# Patient Record
Sex: FEMALE | Race: OTHER | HISPANIC OR LATINO | Employment: FULL TIME | URBAN - METROPOLITAN AREA
[De-identification: names, ages, dates, MRNs, and addresses within clinical notes are randomized per-mention and may not be internally consistent; named-entity substitution may affect disease eponyms.]

---

## 2020-11-14 ENCOUNTER — HOSPITAL ENCOUNTER (OUTPATIENT)
Facility: MEDICAL CENTER | Age: 45
End: 2020-11-14
Attending: PHYSICIAN ASSISTANT
Payer: COMMERCIAL

## 2020-11-14 ENCOUNTER — OFFICE VISIT (OUTPATIENT)
Dept: URGENT CARE | Facility: PHYSICIAN GROUP | Age: 45
End: 2020-11-14
Payer: COMMERCIAL

## 2020-11-14 VITALS
DIASTOLIC BLOOD PRESSURE: 92 MMHG | RESPIRATION RATE: 20 BRPM | HEART RATE: 84 BPM | BODY MASS INDEX: 36.15 KG/M2 | SYSTOLIC BLOOD PRESSURE: 130 MMHG | TEMPERATURE: 97.8 F | HEIGHT: 65 IN | OXYGEN SATURATION: 96 % | WEIGHT: 217 LBS

## 2020-11-14 DIAGNOSIS — J01.90 ACUTE BACTERIAL SINUSITIS: ICD-10-CM

## 2020-11-14 DIAGNOSIS — B96.89 ACUTE BACTERIAL SINUSITIS: ICD-10-CM

## 2020-11-14 PROCEDURE — 99214 OFFICE O/P EST MOD 30 MIN: CPT | Performed by: PHYSICIAN ASSISTANT

## 2020-11-14 PROCEDURE — U0003 INFECTIOUS AGENT DETECTION BY NUCLEIC ACID (DNA OR RNA); SEVERE ACUTE RESPIRATORY SYNDROME CORONAVIRUS 2 (SARS-COV-2) (CORONAVIRUS DISEASE [COVID-19]), AMPLIFIED PROBE TECHNIQUE, MAKING USE OF HIGH THROUGHPUT TECHNOLOGIES AS DESCRIBED BY CMS-2020-01-R: HCPCS

## 2020-11-14 RX ORDER — AMOXICILLIN 500 MG/1
500 CAPSULE ORAL 2 TIMES DAILY
Qty: 14 CAP | Refills: 0 | Status: SHIPPED | OUTPATIENT
Start: 2020-11-14 | End: 2020-11-21

## 2020-11-14 SDOH — HEALTH STABILITY: MENTAL HEALTH: HOW MANY STANDARD DRINKS CONTAINING ALCOHOL DO YOU HAVE ON A TYPICAL DAY?: 5 OR 6

## 2020-11-14 SDOH — HEALTH STABILITY: MENTAL HEALTH: HOW OFTEN DO YOU HAVE A DRINK CONTAINING ALCOHOL?: 4 OR MORE TIMES A WEEK

## 2020-11-14 SDOH — HEALTH STABILITY: MENTAL HEALTH: HOW OFTEN DO YOU HAVE 6 OR MORE DRINKS ON ONE OCCASION?: WEEKLY

## 2020-11-14 ASSESSMENT — ENCOUNTER SYMPTOMS
DIARRHEA: 0
FEVER: 0
MYALGIAS: 0
CONSTIPATION: 0
SORE THROAT: 0
SHORTNESS OF BREATH: 0
VOMITING: 0
EYE PAIN: 0
COUGH: 0
NAUSEA: 0
HEADACHES: 1
CHILLS: 0
SINUS PAIN: 1
ABDOMINAL PAIN: 0

## 2020-11-14 NOTE — PROGRESS NOTES
Subjective:   Ann Munoz is a 44 y.o. female who presents for Sinusitis (Pt reports sinus pain and congestion. Onset one week. )      This is a pleasant 44-year-old female who is had 1 week of sinus congestion which progressed into acutely worsening frontal sinus pressure and pain 3 days ago.  For the history of similar problems.  She does use an intranasal steroid spray but has not tried an antihistamine yet.  She has not noticed any fevers or chills, body aches.  She has a frontal sinus/frontal headache which seems to wax and wane throughout the day.  She has a lot of dogs in her house which she attributes her allergies to.  She has no known sick contacts, no recent travel, and no recent antibiotics      Review of Systems   Constitutional: Negative for chills and fever.   HENT: Positive for congestion and sinus pain. Negative for ear pain and sore throat.    Eyes: Negative for pain.   Respiratory: Negative for cough and shortness of breath.    Cardiovascular: Negative for chest pain.   Gastrointestinal: Negative for abdominal pain, constipation, diarrhea, nausea and vomiting.   Genitourinary: Negative for dysuria.   Musculoskeletal: Negative for myalgias.   Skin: Negative for rash.   Neurological: Positive for headaches.       Medications:    • amoxicillin Caps  • meclizine Tabs  • ondansetron Tabs    Allergies: Patient has no known allergies.    Problem List: Ann Munoz does not have a problem list on file.    Surgical History:  No past surgical history on file.    Past Social Hx: Ann Munoz  reports that she has never smoked. She has never used smokeless tobacco. She reports current alcohol use. She reports current drug use. Frequency: 7.00 times per week. Drug: Marijuana.     Past Family Hx:  Ann Munoz family history is not on file.     Problem list, medications, and allergies reviewed by myself today in Epic.     Objective:     /92 (BP Location: Left arm, Patient Position:  "Sitting, BP Cuff Size: Large adult)   Pulse 84   Temp 36.6 °C (97.8 °F) (Temporal)   Resp 20   Ht 1.651 m (5' 5\")   Wt 98.4 kg (217 lb)   SpO2 96%   BMI 36.11 kg/m²     Physical Exam  Vitals signs reviewed.   Constitutional:       Appearance: Normal appearance.   HENT:      Head: Normocephalic and atraumatic.      Right Ear: External ear normal.      Left Ear: External ear normal.      Nose: Congestion and rhinorrhea present.      Mouth/Throat:      Mouth: Mucous membranes are moist.   Eyes:      Conjunctiva/sclera: Conjunctivae normal.   Cardiovascular:      Rate and Rhythm: Normal rate.   Pulmonary:      Effort: Pulmonary effort is normal.   Skin:     General: Skin is warm and dry.      Capillary Refill: Capillary refill takes less than 2 seconds.   Neurological:      Mental Status: She is alert and oriented to person, place, and time.         Assessment/Plan:     Diagnosis and associated orders:     1. Acute bacterial sinusitis  amoxicillin (AMOXIL) 500 MG Cap    COVID/SARS COV-2 PCR      Comments/MDM:     • Will test for covid out of caution.  • Double worsening c/w ABRS  • Flonase, add antihistamine  • Return if no improvement in 48 hours, ER if worsening or neuro sxs           Differential diagnosis, natural history, supportive care, and indications for immediate follow-up discussed.    Advised the patient to follow-up with the primary care physician for recheck, reevaluation, and consideration of further management.    Please note that this dictation was created using voice recognition software. I have made a reasonable attempt to correct obvious errors, but I expect that there are errors of grammar and possibly content that I did not discover before finalizing the note.    This note was electronically signed by Fabrice Robin PA-C  "

## 2020-11-14 NOTE — PATIENT INSTRUCTIONS
COVID-19 Test Results & Instructions (11-9-20)     After Your COVID-19 Test: Stay Home!  Please stay home until you have received your test results, even if you do not have any symptoms.     What do I do if my test is …           Positive    If your test result was positive (detected), this means the novel coronavirus (SARS-CoV-2) that causes COVID-19 was present in your test sample.   If your symptoms are generally mild and stable, please isolate yourself at home. If it becomes difficult to breathe, contact your primary care provider (by phone) as soon as possible.     Next steps:   • Stay home except to get medical care.   • Follow the instructions for home isolation below.   • Call ahead before visiting your primary care provider or a hospital. Ask for an online virtual visit if possible.     When can my home isolation end?   You should isolate yourself:   • For a minimum of 10 days and   • At least 24 hours have passed since your last fever without the use of fever-reducing medications and   • All other symptoms have improved.                      Negative           If you tested negative (not detected), it is highly unlikely that you have COVID-19. Several respiratory viruses can cause symptoms like yours, including the common cold or the flu.     Next steps:   • Stay home while you are feeling sick.   • Monitor your symptoms and contact your primary care provider if they get worse.   • If you have questions, contact your primary care provider.     When can my home isolation end?   If you were tested because you had close contact (defined below) with someone with COVID-19, you should stay home for 14 days after your close contact with the person.   What counts as close contact?   • You were within 6 feet of someone who has COVID-19 for a total of 15 minutes or more;   • You provided care at home to someone who is sick with COVID-19;   • You had direct physical contact with the person (hugged or kissed them);    • You shared eating or drinking utensils;   • They sneezed, coughed, or somehow got respiratory droplets on you.     If you were tested because you were exposed to a household contact with COVID-19, you should still quarantine yourself for a period of 14 days. The 14-day quarantine period begins once your affected household contact is isolated. If you are unable to quarantine yourself from your affected household contact, the 14-day quarantine period begins when the patient meets criteria to break isolation.     If you were not exposed to a household contact with COVID-19, you may still be contagious while experiencing symptoms, so you should not return to work or regular activities until 24 hours after symptoms fully improve. For example, if you feel back to normal on Tuesday, you should remain isolated until Wednesday.          Instructions for Self-Isolation at Home:   We recommend you stay in your home and minimize contact with others to avoid spreading this infection.   • Stay home except to get medical care. Do not go to work, school or public areas. Avoid using public transportation, ridesharing or taxis.   • Separate yourself from other people in your home as much as possible. Stay in a specific room and away from other people in your home as much as possible. Use a separate bathroom if possible.   • Clean your hands often. Wash your hands often with soap and water for at least 20 seconds. If soap and water are not available, clean your hands with an alcohol-based hand  that contains at least 60% alcohol, covering all surfaces of your hands and rubbing them together until they feel dry. Avoid touching your eyes, nose and mouth with unwashed hands.   • Do not share household items with other people in your home. This includes sharing dishes, drinking glasses, cups, eating utensils, towels or bedding. After using these items, they should be washed thoroughly with soap and water.   • Clean all  "\"high-touch\" surfaces regularly. This includes counters, tabletops, doorknobs, bathroom fixtures, toilets, phones, keyboards, tablets and bedside tables. Also, clean any surfaces that may have blood, stool or body fluids on them. Use a household cleaning spray or wipe, according to the label instructions.   • Cover your coughs and sneezes with a tissue, mask or the inside of your elbow. Throw used tissues in a lined trash can; immediately wash your hands with soap and water for at least 20 seconds or clean your hands with an alcohol-based hand  that contains at least 60% alcohol. Soap and water should be used if hands are visibly dirty.     When seeking care at a healthcare facility:   • Seek prompt medical attention if your illness is worsening (e.g., difficulty breathing).   • When possible, call the healthcare provider before arriving.   • Put on a face mask before you enter the facility.   • If possible, put on a face mask before the ambulance or paramedics arrive.   • These steps will help the healthcare provider's office prevent other people from getting infected or exposed.     Detailed information about these steps can be found on the Centers for Disease Control and Prevention's website.     "

## 2020-11-14 NOTE — LETTER
November 14, 2020    To Whom It May Concern:         This is confirmation that Ann Munoz attended her scheduled appointment with Fabrice Robin P.A.-C. on 11/14/20.   Your employee was seen in our clinic today.  A concern for COVID-19 has been identified and testing is in progress. We are asking you to excuse absences while following self-isolation protocol per Center for Disease Control (CDC) guidelines.  Your employee will be able to access test results through our electronic delivery system called [employer].     If the results of testing are positive, your employee should follow the CDC guidelines.  These are isolation for a minimum of 10 days and at least 24 hours have passed since your last fever without the use of fever-reducing medications and all other symptoms have improved.  The health department may contact you and provide further directions regarding self-isolation and return to work.     Negative result without close contact*:  If your employee was tested due to their symptoms without close contact to someone with COVID-19 and their test is negative: your employee should not return to work or regular activities until 24 hours after symptoms fully improve. (For example, if patient feels back to normal on Tuesday, should remain isolated through Wednesday).      Negative with close contact*:  If your employee was tested due to close contact to someone, they should self isolate for 14 days after their exposure.    Negative with positive household member  If your employee was due to a positive household member they should still quarantine for a period of 14 days.  The 14 day period begins once the household member is isolated. If unable to quarantine (for example mom from infant), the CDC advises an additional 14-day quarantine period from the COVID-19 household member.   In general, repeat testing is not necessary and not offered through our [facility] urgent care.     This is the only note that  will be provided from [employer] for this visit.  Your employee will require an appointment with a primary care provider if FMLA or disability forms are required.  If you have any questions please do not hesitate to call me at the phone number listed below.    Sincerely,    ELMER Poole.-C.  [phone]

## 2020-11-16 DIAGNOSIS — J01.90 ACUTE BACTERIAL SINUSITIS: ICD-10-CM

## 2020-11-16 DIAGNOSIS — B96.89 ACUTE BACTERIAL SINUSITIS: ICD-10-CM

## 2020-11-16 LAB
COVID ORDER STATUS COVID19: NORMAL
SARS-COV-2 RNA RESP QL NAA+PROBE: NOTDETECTED
SPECIMEN SOURCE: NORMAL

## 2020-12-29 ENCOUNTER — HOSPITAL ENCOUNTER (OUTPATIENT)
Facility: MEDICAL CENTER | Age: 45
End: 2020-12-29
Attending: STUDENT IN AN ORGANIZED HEALTH CARE EDUCATION/TRAINING PROGRAM
Payer: COMMERCIAL

## 2020-12-29 ENCOUNTER — OFFICE VISIT (OUTPATIENT)
Dept: URGENT CARE | Facility: PHYSICIAN GROUP | Age: 45
End: 2020-12-29
Payer: COMMERCIAL

## 2020-12-29 VITALS
WEIGHT: 200 LBS | TEMPERATURE: 99.1 F | DIASTOLIC BLOOD PRESSURE: 86 MMHG | RESPIRATION RATE: 16 BRPM | SYSTOLIC BLOOD PRESSURE: 138 MMHG | OXYGEN SATURATION: 97 % | HEART RATE: 77 BPM | HEIGHT: 64 IN | BODY MASS INDEX: 34.15 KG/M2

## 2020-12-29 DIAGNOSIS — Z20.822 COVID-19 RULED OUT: ICD-10-CM

## 2020-12-29 DIAGNOSIS — Z71.89 COUNSELED ABOUT COVID-19 VIRUS INFECTION: ICD-10-CM

## 2020-12-29 DIAGNOSIS — R11.2 NON-INTRACTABLE VOMITING WITH NAUSEA, UNSPECIFIED VOMITING TYPE: ICD-10-CM

## 2020-12-29 DIAGNOSIS — B34.9 ACUTE VIRAL SYNDROME: ICD-10-CM

## 2020-12-29 LAB — COVID ORDER STATUS COVID19: NORMAL

## 2020-12-29 PROCEDURE — 99214 OFFICE O/P EST MOD 30 MIN: CPT | Mod: CS | Performed by: STUDENT IN AN ORGANIZED HEALTH CARE EDUCATION/TRAINING PROGRAM

## 2020-12-29 PROCEDURE — U0003 INFECTIOUS AGENT DETECTION BY NUCLEIC ACID (DNA OR RNA); SEVERE ACUTE RESPIRATORY SYNDROME CORONAVIRUS 2 (SARS-COV-2) (CORONAVIRUS DISEASE [COVID-19]), AMPLIFIED PROBE TECHNIQUE, MAKING USE OF HIGH THROUGHPUT TECHNOLOGIES AS DESCRIBED BY CMS-2020-01-R: HCPCS

## 2020-12-29 RX ORDER — ONDANSETRON 4 MG/1
4 TABLET, ORALLY DISINTEGRATING ORAL EVERY 6 HOURS PRN
Qty: 20 TAB | Refills: 0 | Status: SHIPPED | OUTPATIENT
Start: 2020-12-29 | End: 2022-01-21

## 2020-12-29 RX ORDER — ALBUTEROL SULFATE 90 UG/1
2 AEROSOL, METERED RESPIRATORY (INHALATION) EVERY 6 HOURS PRN
Qty: 8.5 G | Refills: 0 | Status: SHIPPED | OUTPATIENT
Start: 2020-12-29 | End: 2022-01-21

## 2020-12-29 NOTE — PROGRESS NOTES
Subjective:   CHIEF COMPLAINT  Chief Complaint   Patient presents with   • Emesis     nausea, body aches, headaches, feverish x 2 days       HPI  Ann Munoz is a 45 y.o. female who presents with a chief complaint of body aches, nausea and vomiting which started approximately 2 days ago after attending a work party.  Following the work party multiple people have called off of work due to being sick.  Uncertain if anyone tested positive for Covid.  Reports she vomited once today.  No diarrhea.  Admits experiencing generalized abdominal discomfort.  Says she feels somewhat constipated; last BM was yesterday. Symptoms improved with Motrin.  No aggravating factors.  She admits associated symptoms of wheezing.  She denies cough, shortness of breath, dysuria or hematuria.  No history of reactive airway disease or tobacco use.    REVIEW OF SYSTEMS  General: no fever or chills  GI: Admits nausea vomiting  See HPI for further details.     PAST MEDICAL HISTORY       SURGICAL HISTORY  patient denies any surgical history    ALLERGIES  No Known Allergies    CURRENT MEDICATIONS  Home Medications     Reviewed by Mauricio Dc D.O. (Physician) on 12/29/20 at 1157  Med List Status: <None>   Medication Last Dose Status   meclizine (ANTIVERT) 25 MG Tab Not Taking Active   ondansetron (ZOFRAN) 4 MG Tab tablet Not Taking Active                SOCIAL HISTORY  Social History     Tobacco Use   • Smoking status: Never Smoker   • Smokeless tobacco: Never Used   Substance and Sexual Activity   • Alcohol use: Yes     Frequency: 4 or more times a week     Drinks per session: 5 or 6     Binge frequency: Weekly   • Drug use: Yes     Frequency: 7.0 times per week     Types: Marijuana   • Sexual activity: Not on file       FAMILY HISTORY  No family history on file.       Objective:   PHYSICAL EXAM  VITAL SIGNS: /86 (BP Location: Left arm, Patient Position: Sitting, BP Cuff Size: Adult)   Pulse 77   Temp 37.3 °C (99.1 °F)  "(Temporal)   Resp 16   Ht 1.626 m (5' 4\")   Wt 90.7 kg (200 lb)   LMP 12/07/2020   SpO2 97%   BMI 34.33 kg/m²     Gen: no acute distress, normal voice  Skin: dry, intact, moist mucosal membranes  Abdomen: Soft, no guarding, mild RLQ TTP w/o rebound or guarding  Lungs: scattered wheezing w/ symmetric expansion  CV: RRR w/o murmurs or clicks  Psych: normal affect, normal judgement, alert, awake    Assessment/Plan:     1. Acute viral syndrome  COVID/SARS COV-2 PCR    albuterol 108 (90 Base) MCG/ACT Aero Soln inhalation aerosol   2. Non-intractable vomiting with nausea, unspecified vomiting type  ondansetron (ZOFRAN ODT) 4 MG TABLET DISPERSIBLE   3. COVID-19 ruled out  COVID/SARS COV-2 PCR   4. Counseled about COVID-19 virus infection     Signs and symptoms are consistent with a viral infection.  She did demonstrate very mild RLQ TTP on exam however there is no rebound or guarding.  She is afebrile and able to tolerate p.o. intake.  Exam is reassuring and no red flags.  Will treat symptomatically with return precautions  -Rx sent for Zofran and albuterol  -If at any point the abdominal pain worsens, develops any worsening/concerning symptoms or has any further questions or concerns she was instructed to return to emergency room or urgent care for further evaluation  -Ordered Covid.  Results will be sent through [employer].  -Instructed to quarantine until Covid results have been returned  -Encouraged hydration and symptomatic treatment with Tylenol/ibuprofen prn  -Pt was in full understanding and agreement with the plan.    Differential diagnosis, natural history, supportive care, and indications for immediate follow-up discussed. All questions answered. Patient agrees with the plan of care.    Follow-up as needed if symptoms worsen or fail to improve to PCP, Urgent care or Emergency Room.       Please note that this dictation was created using voice recognition software. I have made a reasonable attempt to correct " obvious errors, but I expect that there are errors of grammar and possibly content that I did not discover before finalizing the note.

## 2020-12-31 LAB
SARS-COV-2 RNA RESP QL NAA+PROBE: NOTDETECTED
SPECIMEN SOURCE: NORMAL

## 2022-01-14 ENCOUNTER — OFFICE VISIT (OUTPATIENT)
Dept: URGENT CARE | Facility: PHYSICIAN GROUP | Age: 47
End: 2022-01-14
Payer: COMMERCIAL

## 2022-01-14 VITALS
RESPIRATION RATE: 20 BRPM | WEIGHT: 220.6 LBS | DIASTOLIC BLOOD PRESSURE: 86 MMHG | SYSTOLIC BLOOD PRESSURE: 126 MMHG | OXYGEN SATURATION: 98 % | HEIGHT: 65 IN | BODY MASS INDEX: 36.75 KG/M2 | TEMPERATURE: 97.2 F | HEART RATE: 79 BPM

## 2022-01-14 DIAGNOSIS — J01.00 ACUTE NON-RECURRENT MAXILLARY SINUSITIS: ICD-10-CM

## 2022-01-14 PROCEDURE — 99213 OFFICE O/P EST LOW 20 MIN: CPT | Performed by: PHYSICIAN ASSISTANT

## 2022-01-14 RX ORDER — AMOXICILLIN AND CLAVULANATE POTASSIUM 875; 125 MG/1; MG/1
1 TABLET, FILM COATED ORAL 2 TIMES DAILY
Qty: 14 TABLET | Refills: 0 | Status: SHIPPED | OUTPATIENT
Start: 2022-01-14 | End: 2022-01-21

## 2022-01-14 RX ORDER — COVID-19 TEST SPECIMEN COLLECT
MISCELLANEOUS MISCELLANEOUS
COMMUNITY
Start: 2021-12-30 | End: 2022-01-21

## 2022-01-14 NOTE — LETTER
January 14, 2022         Patient: Ann Munoz   YOB: 1975   Date of Visit: 1/14/2022           To Whom it May Concern:    Ann Munoz was seen in my clinic on 1/14/2022.     If you have any questions or concerns, please don't hesitate to call.        Sincerely,           Sancho Mcdermott P.A.-C.  Electronically Signed

## 2022-01-15 NOTE — PROGRESS NOTES
"Subjective:   Ann Munoz is a 46 y.o. female who presents for Headache (face pain/ pressure )      HPI  Patient is a 46-year-old female who presents to clinic with concerns of a sinus infection. She states she is prone to sinus infections. She reports she had a headache for about 6 days which was mild followed by moderate to severe maxillary sinus pain a couple days ago. Associated nasal congestion. Mucinex sinus without relief. Flonase without much relief. Denies any fever, chills, cough, chest pain, SOB, abdominal pain, vomiting, diarrhea. Received COVID vaccine. No known exposure to COVID.         Medications:    • albuterol Aers  • meclizine Tabs  • ondansetron Tabs  • ondansetron Tbdp    Allergies: Patient has no known allergies.    Problem List: Ann Munoz does not have a problem list on file.    Surgical History:  No past surgical history on file.    Past Social Hx: Ann Munoz  reports that she has never smoked. She has never used smokeless tobacco. She reports current alcohol use. She reports current drug use. Frequency: 7.00 times per week. Drug: Marijuana.     Past Family Hx:  Ann Munoz family history is not on file.     Problem list, medications, and allergies reviewed by myself today in Epic.     Objective:     /86 (BP Location: Right arm, Patient Position: Sitting)   Pulse 79   Temp 36.2 °C (97.2 °F) (Temporal)   Resp 20   Ht 1.651 m (5' 5\")   Wt 100 kg (220 lb 9.6 oz)   SpO2 98%   BMI 36.71 kg/m²     Physical Exam  Vitals reviewed.   Constitutional:       General: She is not in acute distress.     Appearance: Normal appearance. She is not ill-appearing or toxic-appearing.   HENT:      Head: Normocephalic.      Right Ear: Tympanic membrane normal.      Left Ear: Tympanic membrane normal.      Nose: Mucosal edema and rhinorrhea present. Rhinorrhea is purulent.      Right Sinus: Maxillary sinus tenderness and frontal sinus tenderness present.      Left Sinus: " Maxillary sinus tenderness and frontal sinus tenderness present.      Mouth/Throat:      Mouth: Mucous membranes are moist.      Pharynx: Oropharynx is clear. No oropharyngeal exudate or posterior oropharyngeal erythema.   Eyes:      Conjunctiva/sclera: Conjunctivae normal.      Pupils: Pupils are equal, round, and reactive to light.   Cardiovascular:      Rate and Rhythm: Normal rate and regular rhythm.      Heart sounds: Normal heart sounds.   Pulmonary:      Effort: Pulmonary effort is normal. No respiratory distress.      Breath sounds: Normal breath sounds. No wheezing, rhonchi or rales.   Musculoskeletal:      Cervical back: Neck supple.   Skin:     General: Skin is warm and dry.   Neurological:      General: No focal deficit present.      Mental Status: She is alert and oriented to person, place, and time.   Psychiatric:         Mood and Affect: Mood normal.         Behavior: Behavior normal.         Diagnosis and associated orders:     1. Acute non-recurrent maxillary sinusitis  amoxicillin-clavulanate (AUGMENTIN) 875-125 MG Tab        Comments/MDM:     Discussed patient's signs and symptoms are consistent with sinusitis  Contingent antibiotic prescription given to patient to fill upon meeting criteria of strict guidelines discussed in length.   Encouraged plenty of fluids, Flonase, nasal saline washes or ghanshyam pot, Mucinex, Ibuprofen and Tylenol for pain. They may take a non-drowsy oral antihistamine.  Advised to return to the clinic or present to the ED if any worsening symptoms or fever, chills, vision changes, difficulty breathing, or any other concerns.   Pt declined COVID test.        I personally reviewed prior external notes and test results pertinent to today's visit. Supportive care, natural history, differential diagnoses, and indications for immediate follow-up discussed. Patient expresses understanding and agrees to plan. Patient denies any other questions or concerns.     Follow-up with the  primary care physician for recheck, reevaluation, and consideration of further management.    Please note that this dictation was created using voice recognition software. I have made a reasonable attempt to correct obvious errors, but I expect that there are errors of grammar and possibly content that I did not discover before finalizing the note.    This note was electronically signed by Sancho Mcdermott PA-C

## 2022-01-21 ENCOUNTER — HOSPITAL ENCOUNTER (OUTPATIENT)
Dept: RADIOLOGY | Facility: MEDICAL CENTER | Age: 47
End: 2022-01-21
Attending: STUDENT IN AN ORGANIZED HEALTH CARE EDUCATION/TRAINING PROGRAM
Payer: COMMERCIAL

## 2022-01-21 ENCOUNTER — OFFICE VISIT (OUTPATIENT)
Dept: URGENT CARE | Facility: PHYSICIAN GROUP | Age: 47
End: 2022-01-21
Payer: COMMERCIAL

## 2022-01-21 VITALS
TEMPERATURE: 97.5 F | RESPIRATION RATE: 16 BRPM | HEIGHT: 65 IN | WEIGHT: 215 LBS | DIASTOLIC BLOOD PRESSURE: 78 MMHG | SYSTOLIC BLOOD PRESSURE: 130 MMHG | BODY MASS INDEX: 35.82 KG/M2 | OXYGEN SATURATION: 96 % | HEART RATE: 84 BPM

## 2022-01-21 DIAGNOSIS — R10.31 RIGHT LOWER QUADRANT ABDOMINAL PAIN: ICD-10-CM

## 2022-01-21 LAB
APPEARANCE UR: NORMAL
BILIRUB UR STRIP-MCNC: NEGATIVE MG/DL
COLOR UR AUTO: NORMAL
GLUCOSE UR STRIP.AUTO-MCNC: NEGATIVE MG/DL
INT CON NEG: NORMAL
INT CON POS: NORMAL
KETONES UR STRIP.AUTO-MCNC: NORMAL MG/DL
LEUKOCYTE ESTERASE UR QL STRIP.AUTO: NEGATIVE
NITRITE UR QL STRIP.AUTO: NEGATIVE
PH UR STRIP.AUTO: 6.5 [PH] (ref 5–8)
POC URINE PREGNANCY TEST: NEGATIVE
PROT UR QL STRIP: NEGATIVE MG/DL
RBC UR QL AUTO: NEGATIVE
SP GR UR STRIP.AUTO: 1.03
UROBILINOGEN UR STRIP-MCNC: 1 MG/DL

## 2022-01-21 PROCEDURE — 81025 URINE PREGNANCY TEST: CPT | Performed by: STUDENT IN AN ORGANIZED HEALTH CARE EDUCATION/TRAINING PROGRAM

## 2022-01-21 PROCEDURE — 74177 CT ABD & PELVIS W/CONTRAST: CPT

## 2022-01-21 PROCEDURE — 700117 HCHG RX CONTRAST REV CODE 255: Performed by: STUDENT IN AN ORGANIZED HEALTH CARE EDUCATION/TRAINING PROGRAM

## 2022-01-21 PROCEDURE — 99215 OFFICE O/P EST HI 40 MIN: CPT | Performed by: STUDENT IN AN ORGANIZED HEALTH CARE EDUCATION/TRAINING PROGRAM

## 2022-01-21 PROCEDURE — 81002 URINALYSIS NONAUTO W/O SCOPE: CPT | Performed by: STUDENT IN AN ORGANIZED HEALTH CARE EDUCATION/TRAINING PROGRAM

## 2022-01-21 RX ADMIN — IOHEXOL 100 ML: 350 INJECTION, SOLUTION INTRAVENOUS at 17:00

## 2022-01-21 NOTE — PROGRESS NOTES
Subjective:   CHIEF COMPLAINT  Chief Complaint   Patient presents with   • Abdominal Pain     RLQ. Onset 2 days.        HPI  Ann Munoz is a 46 y.o. female who presents with a chief complaint of right lower quadrant abdominal pain which started on Wednesday night (2 days ago).  Symptoms are described as an achy discomfort which have been constant since onset.  Overall symptoms have improved today but were rather severe yesterday.  Symptoms are triggered with deep breaths but no additional aggravating or modifying factors.  Patient reports she has had a diminished appetite but has been tolerating p.o. intake without any nausea, vomiting, diarrhea or constipation.  Patient has had normal bowel movements.  Reports she had pot stickers and a hot dog yesterday without any issues.  No fevers, chills or body aches.  No dysuria or hematuria.  No history of ovarian cyst and patient has regular menstrual cycles.     REVIEW OF SYSTEMS  General: no fever or chills  GI: no nausea or vomiting  See HPI for further details.    PAST MEDICAL HISTORY  There are no problems to display for this patient.      SURGICAL HISTORY  patient denies any surgical history    ALLERGIES  No Known Allergies    CURRENT MEDICATIONS  Home Medications     Reviewed by Dannie Powell't (Medical Assistant) on 01/21/22 at 0938  Med List Status: <None>   Medication Last Dose Status   albuterol 108 (90 Base) MCG/ACT Aero Soln inhalation aerosol Not Taking Flagged for Removal   amoxicillin-clavulanate (AUGMENTIN) 875-125 MG Tab Not Taking Flagged for Removal   meclizine (ANTIVERT) 25 MG Tab Not Taking Flagged for Removal   ondansetron (ZOFRAN ODT) 4 MG TABLET DISPERSIBLE Not Taking Flagged for Removal   ondansetron (ZOFRAN) 4 MG Tab tablet Not Taking Flagged for Removal                SOCIAL HISTORY  Social History     Tobacco Use   • Smoking status: Never Smoker   • Smokeless tobacco: Never Used   Vaping Use   • Vaping Use: Never used  "  Substance and Sexual Activity   • Alcohol use: Not Currently   • Drug use: Yes     Frequency: 7.0 times per week     Types: Marijuana, Inhaled   • Sexual activity: Not on file       FAMILY HISTORY  History reviewed. No pertinent family history.       Objective:   PHYSICAL EXAM  VITAL SIGNS: /78 (BP Location: Right arm, Patient Position: Sitting, BP Cuff Size: Large adult)   Pulse 84   Temp 36.4 °C (97.5 °F) (Temporal)   Resp 16   Ht 1.651 m (5' 5\")   Wt 97.5 kg (215 lb)   SpO2 96%   BMI 35.78 kg/m²     Gen: no acute distress, normal voice  Skin: dry, intact, moist mucosal membranes  Lungs: CTAB w/ symmetric expansion  CV: RRR w/o murmurs or clicks  Abdomen: Bowel sounds normal, soft, localized TTP along McBurney's point without rebound. Slight involuntary guarding.    Psych: normal affect, normal judgement, alert, awake    UA: Trace ketones. No blood, nitrites or leukocytes.      RADIOLOGY RESULTS   CT-ABDOMEN-PELVIS WITH    Result Date: 1/21/2022 1/21/2022 4:07 PM HISTORY/REASON FOR EXAM:  RLQ abdominal pain (Age >= 14y). TECHNIQUE/EXAM DESCRIPTION:   CT scan of the abdomen and pelvis with contrast. Contrast-enhanced helical scanning was obtained from the diaphragmatic domes through the pubic symphysis following the bolus administration of nonionic contrast without complication. 100 mL of Omnipaque 350 nonionic contrast was administered without complication. Low dose optimization technique was utilized for this CT exam including automated exposure control and adjustment of the mA and/or kV according to patient size. COMPARISON: No prior studies available. FINDINGS: Lower Chest: Unremarkable. Liver: There is diffuse low-attenuation in the liver consistent with hepatic steatosis. The liver is enlarged. Spleen: Unremarkable. Pancreas: Unremarkable. Gallbladder: The gallbladder has been resected. Biliary: Nondilated. Adrenal glands: Normal. Kidneys: Unremarkable without hydronephrosis. Bowel: No " obstruction or acute inflammation. There is diverticulosis without evidence of diverticulitis. A normal-appearing air-filled appendix identified. Lymph nodes: No adenopathy. Vasculature: Unremarkable. Peritoneum: Unremarkable without ascites. Musculoskeletal: No acute or destructive process. There is disc space narrowing with discogenic endplate change at L5-S1. Pelvis: No adenopathy or free fluid. The uterus and adnexa are unremarkable.     1.  Diverticulosis without evidence of diverticulitis. 2.  Hepatic steatosis             Assessment/Plan:     1. Right lower quadrant abdominal pain  CT-ABDOMEN-PELVIS WITH    POCT PREGNANCY    POCT Urinalysis   Patient with abrupt onset of RLQ with localized TTP on examination so CT scan was ordered which was unremarkable.  I contacted the patient and reviewed imaging over the phone.  Given the normal CT scan I provided reassurance to the patient that symptoms should be self-limiting.  If she develops any new/worsening symptoms patient was instructed return urgent care/ED as needed.  The patient understood everything discussed.  All questions were answered.    Differential diagnosis, natural history, supportive care, and indications for immediate follow-up discussed. All questions answered. Patient agrees with the plan of care.    Follow-up as needed if symptoms worsen or fail to improve to PCP, Urgent care or Emergency Room.    > 40 minutes was spent on this encounter including face-to-face time, reviewing CT scan over the phone, discussing the diagnosis, medical management, follow-up, emergency room precautions and charting. This does not include time spent on separately billable procedures/tests.    Please note that this dictation was created using voice recognition software. I have made a reasonable attempt to correct obvious errors, but I expect that there are errors of grammar and possibly content that I did not discover before finalizing the note.

## 2022-02-04 ENCOUNTER — HOSPITAL ENCOUNTER (OUTPATIENT)
Dept: RADIOLOGY | Facility: MEDICAL CENTER | Age: 47
End: 2022-02-04
Attending: FAMILY MEDICINE
Payer: COMMERCIAL

## 2022-02-28 ENCOUNTER — HOSPITAL ENCOUNTER (OUTPATIENT)
Dept: RADIOLOGY | Facility: MEDICAL CENTER | Age: 47
End: 2022-02-28
Attending: FAMILY MEDICINE
Payer: COMMERCIAL

## 2022-02-28 DIAGNOSIS — R10.9 ABDOMINAL PAIN, UNSPECIFIED ABDOMINAL LOCATION: ICD-10-CM

## 2022-02-28 DIAGNOSIS — N63.20 MASS OF LEFT BREAST: ICD-10-CM

## 2022-02-28 PROCEDURE — G0279 TOMOSYNTHESIS, MAMMO: HCPCS

## 2022-02-28 PROCEDURE — 76700 US EXAM ABDOM COMPLETE: CPT

## 2022-02-28 PROCEDURE — 76642 ULTRASOUND BREAST LIMITED: CPT | Mod: RT

## 2022-09-22 ENCOUNTER — OFFICE VISIT (OUTPATIENT)
Dept: URGENT CARE | Facility: PHYSICIAN GROUP | Age: 47
End: 2022-09-22
Payer: COMMERCIAL

## 2022-09-22 DIAGNOSIS — R10.11 RIGHT UPPER QUADRANT ABDOMINAL PAIN: ICD-10-CM

## 2022-09-22 PROCEDURE — 99213 OFFICE O/P EST LOW 20 MIN: CPT | Performed by: FAMILY MEDICINE

## 2022-09-22 RX ORDER — OMEPRAZOLE 40 MG/1
40 CAPSULE, DELAYED RELEASE ORAL DAILY
Qty: 30 CAPSULE | Refills: 0 | Status: SHIPPED | OUTPATIENT
Start: 2022-09-22

## 2022-09-22 RX ORDER — SUCRALFATE 1 G/1
1 TABLET ORAL
Qty: 60 TABLET | Refills: 0 | Status: SHIPPED | OUTPATIENT
Start: 2022-09-22

## 2022-09-22 ASSESSMENT — ENCOUNTER SYMPTOMS
FEVER: 0
ABDOMINAL PAIN: 1

## 2022-09-22 NOTE — PROGRESS NOTES
"Subjective:     Ann Munoz is a 46 y.o. female who presents for Abdominal Pain (Started Monday , diverticulitis/)    HPI  Pt presents for evaluation of an acute problem  Patient with abdominal pain for the past 4 days  Pain is constant and not improving  Pain is focally in the right upper quadrant  Has had similar pain multiple times in the past  Was told in the past this could be diverticulitis  Seen in January of this year for similar episode and had abdominal CT which was within normal limits other than diverticulosis  Also had a complete abdominal ultrasound which was within normal limits in February of this year  No vomiting or diarrhea    Review of Systems   Constitutional:  Negative for fever.   Gastrointestinal:  Positive for abdominal pain.     PMH:  has no past medical history on file.  MEDS: No current outpatient medications on file.  ALLERGIES: No Known Allergies  SURGHX: History reviewed. No pertinent surgical history.  SOCHX:  reports that she has never smoked. She has never used smokeless tobacco. She reports that she does not currently use alcohol. She reports current drug use. Frequency: 7.00 times per week. Drugs: Marijuana and Inhaled.     Objective:   BP (!) (P) 140/82 (BP Location: Left arm, Patient Position: Sitting, BP Cuff Size: Adult)   Pulse (P) 85   Temp (P) 36.9 °C (98.5 °F) (Temporal)   Resp (P) 16   Ht (P) 1.651 m (5' 5\")   Wt (P) 95.3 kg (210 lb)   SpO2 (P) 95%   BMI (P) 34.95 kg/m²     Physical Exam  Constitutional:       General: She is not in acute distress.     Appearance: She is well-developed. She is not diaphoretic.   Pulmonary:      Effort: Pulmonary effort is normal.   Abdominal:      General: Abdomen is flat. Bowel sounds are normal. There is no distension.      Palpations: Abdomen is soft.      Tenderness: There is no right CVA tenderness, left CVA tenderness, guarding or rebound.      Comments: +TTP focally in the right upper quadrant and epigastric area, " positive Naylor's   Neurological:      Mental Status: She is alert.     Assessment/Plan:   Assessment    1. Right upper quadrant abdominal pain  - omeprazole (PRILOSEC) 40 MG delayed-release capsule; Take 1 Capsule by mouth every day.  Dispense: 30 Capsule; Refill: 0  - sucralfate (CARAFATE) 1 GM Tab; Take 1 Tablet by mouth 3 times a day before meals.  Dispense: 60 Tablet; Refill: 0    Patient with right upper quadrant pain.  Has had work-up in the past from PCP and through urgent care.  Has no gallbladder surgically.  Her exam is most consistent with dyspepsia.  Has never been treated with antacids in the past.  Start omeprazole 40 mg daily and also sucralfate as needed.  Reviewed fall precautions and will follow up with PCP.

## 2022-09-22 NOTE — LETTER
September 22, 2022    To Whom It May Concern:         This is confirmation that Ann Munoz attended her scheduled appointment with Andre Tloliver M.D. on 9/22/22.  Please excuse her from work today.  She will also be off of work for the next 2 days.  She may return to work 9/25/2022 at the earliest.  Thank you for making accommodations as she recovers.         If you have any questions please do not hesitate to call me at the phone number listed below.    Sincerely,          Andre Tolliver M.D.  [phone]

## 2022-10-19 DIAGNOSIS — R10.11 RIGHT UPPER QUADRANT ABDOMINAL PAIN: ICD-10-CM

## 2023-01-17 RX ORDER — OMEPRAZOLE 40 MG/1
40 CAPSULE, DELAYED RELEASE ORAL DAILY
Qty: 30 CAPSULE | Refills: 0 | OUTPATIENT
Start: 2023-01-17

## 2023-06-30 ENCOUNTER — OFFICE VISIT (OUTPATIENT)
Dept: URGENT CARE | Facility: PHYSICIAN GROUP | Age: 48
End: 2023-06-30
Payer: COMMERCIAL

## 2023-06-30 VITALS
WEIGHT: 210 LBS | OXYGEN SATURATION: 98 % | SYSTOLIC BLOOD PRESSURE: 128 MMHG | HEART RATE: 69 BPM | HEIGHT: 65 IN | RESPIRATION RATE: 15 BRPM | TEMPERATURE: 97.8 F | BODY MASS INDEX: 34.99 KG/M2 | DIASTOLIC BLOOD PRESSURE: 80 MMHG

## 2023-06-30 DIAGNOSIS — L25.9 ACUTE CONTACT DERMATITIS: ICD-10-CM

## 2023-06-30 DIAGNOSIS — R21 RASH: ICD-10-CM

## 2023-06-30 PROCEDURE — 3074F SYST BP LT 130 MM HG: CPT | Performed by: FAMILY MEDICINE

## 2023-06-30 PROCEDURE — 3079F DIAST BP 80-89 MM HG: CPT | Performed by: FAMILY MEDICINE

## 2023-06-30 PROCEDURE — 99213 OFFICE O/P EST LOW 20 MIN: CPT | Performed by: FAMILY MEDICINE

## 2023-06-30 RX ORDER — TRIAMCINOLONE ACETONIDE 1 MG/G
OINTMENT TOPICAL
Qty: 80 G | Refills: 0 | Status: SHIPPED | OUTPATIENT
Start: 2023-06-30

## 2023-06-30 RX ORDER — DEXAMETHASONE 6 MG/1
6 TABLET ORAL DAILY
Qty: 3 TABLET | Refills: 0 | Status: SHIPPED | OUTPATIENT
Start: 2023-06-30

## 2023-06-30 NOTE — LETTER
June 30, 2023         Patient: Ann Munoz   YOB: 1975   Date of Visit: 6/30/2023           To Whom it May Concern:    Ann Munoz was seen in my clinic on 6/30/2023. She may return to work in 2-3 days.    If you have any questions or concerns, please don't hesitate to call.        Sincerely,           Juarez Smith M.D.  Electronically Signed

## 2023-07-01 NOTE — PROGRESS NOTES
"Subjective     Ann Munoz is a 47 y.o. female who presents with Rash (Right leg )      - This is a pleasant and nontoxic appearing 47 y.o. person who has come to the walk-in clinic today for:    #1) hx psoriasis on elbows and legs for years. Scratches Rt leg a lot due to itching, worse past month. Started to use neosporin to help itching on Rt leg and around 3 days rash started to develop around the area and more itching started       ALLERGIES:  Patient has no known allergies.     PMH:  History reviewed. No pertinent past medical history.     PSH:  History reviewed. No pertinent surgical history.    MEDS:    Current Outpatient Medications:     dexamethasone (DECADRON) 6 MG Tab, Take 1 Tablet by mouth every day., Disp: 3 Tablet, Rfl: 0    triamcinolone acetonide (KENALOG) 0.1 % Ointment, AAA BID x 5-7 days, Disp: 80 g, Rfl: 0    omeprazole (PRILOSEC) 40 MG delayed-release capsule, Take 1 Capsule by mouth every day., Disp: 30 Capsule, Rfl: 0    sucralfate (CARAFATE) 1 GM Tab, Take 1 Tablet by mouth 3 times a day before meals., Disp: 60 Tablet, Rfl: 0    ** I have documented what I find to be significant in regards to past medical, social, family and surgical history  in my HPI or under PMH/PSH/FH review section, otherwise it is noncontributory **         HPI    Review of Systems   Skin:  Positive for itching and rash.   All other systems reviewed and are negative.             Objective     /80 (BP Location: Right arm, Patient Position: Sitting, BP Cuff Size: Adult long)   Pulse 69   Temp 36.6 °C (97.8 °F) (Temporal)   Resp 15   Ht 1.651 m (5' 5\")   Wt 95.3 kg (210 lb)   SpO2 98%   BMI 34.95 kg/m²      Physical Exam  Vitals and nursing note reviewed.   Constitutional:       General: She is not in acute distress.     Appearance: Normal appearance. She is well-developed.   HENT:      Head: Normocephalic.   Pulmonary:      Effort: Pulmonary effort is normal. No respiratory distress.   Musculoskeletal: "        Legs:       Comments: RLE: chronic psoriasis lichenification, around medial side calf some erythema. No posterior pain to palp or edema. No warmth   Neurological:      Mental Status: She is alert.      Motor: No abnormal muscle tone.   Psychiatric:         Mood and Affect: Mood normal.         Behavior: Behavior normal.           Assessment & Plan     1. Rash        2. Acute contact dermatitis  dexamethasone (DECADRON) 6 MG Tab    triamcinolone acetonide (KENALOG) 0.1 % Ointment          - Dx, plan & d/c instructions discussed   - Rest and elevate leg  - stop scratching  - stop neosporin  - 3 day recheck as needed       Follow up with your regular primary care providers office for a recheck on today's visit. ER if not improving in 2-3 days or if feeling/getting worse.     Patient left in stable condition         Pertinent prior office visit notes in Epic have been reviewed by me today on day of this visit.

## 2023-08-01 ENCOUNTER — OFFICE VISIT (OUTPATIENT)
Dept: URGENT CARE | Facility: PHYSICIAN GROUP | Age: 48
End: 2023-08-01
Payer: COMMERCIAL

## 2023-08-01 VITALS
DIASTOLIC BLOOD PRESSURE: 72 MMHG | SYSTOLIC BLOOD PRESSURE: 140 MMHG | HEIGHT: 65 IN | WEIGHT: 208 LBS | OXYGEN SATURATION: 97 % | HEART RATE: 74 BPM | BODY MASS INDEX: 34.66 KG/M2 | RESPIRATION RATE: 18 BRPM | TEMPERATURE: 98.2 F

## 2023-08-01 DIAGNOSIS — T78.40XA ALLERGIC REACTION, INITIAL ENCOUNTER: ICD-10-CM

## 2023-08-01 DIAGNOSIS — L20.9 ATOPIC DERMATITIS, UNSPECIFIED TYPE: ICD-10-CM

## 2023-08-01 DIAGNOSIS — L29.9 PRURITUS: ICD-10-CM

## 2023-08-01 PROCEDURE — 3078F DIAST BP <80 MM HG: CPT | Performed by: FAMILY MEDICINE

## 2023-08-01 PROCEDURE — 3077F SYST BP >= 140 MM HG: CPT | Performed by: FAMILY MEDICINE

## 2023-08-01 PROCEDURE — 99213 OFFICE O/P EST LOW 20 MIN: CPT | Performed by: FAMILY MEDICINE

## 2023-08-01 RX ORDER — METHYLPREDNISOLONE 4 MG/1
TABLET ORAL
Qty: 21 TABLET | Refills: 0 | Status: SHIPPED | OUTPATIENT
Start: 2023-08-01

## 2023-08-01 RX ORDER — TRIAMCINOLONE ACETONIDE 1 MG/G
1 CREAM TOPICAL 2 TIMES DAILY
Qty: 30 G | Refills: 1 | Status: SHIPPED | OUTPATIENT
Start: 2023-08-01 | End: 2023-08-15

## 2023-08-01 RX ORDER — HYDROXYZINE HYDROCHLORIDE 25 MG/1
25 TABLET, FILM COATED ORAL 3 TIMES DAILY PRN
Qty: 30 TABLET | Refills: 0 | Status: SHIPPED | OUTPATIENT
Start: 2023-08-01

## 2023-08-01 ASSESSMENT — ENCOUNTER SYMPTOMS
DIZZINESS: 0
COUGH: 0
MYALGIAS: 0
VOMITING: 0
NAUSEA: 0
SHORTNESS OF BREATH: 0
FEVER: 0
CHILLS: 0
SORE THROAT: 0

## 2023-08-01 NOTE — LETTER
August 1, 2023         Patient: Ann Munoz   YOB: 1975   Date of Visit: 8/1/2023           To Whom it May Concern:    Ann Munoz was seen in my clinic on 8/1/2023. She may return to work on 8/3/2023.    If you have any questions or concerns, please don't hesitate to call.        Sincerely,           Mauricio Ayala M.D.  Electronically Signed

## 2023-08-01 NOTE — PROGRESS NOTES
Subjective:   Ann Munoz is a 47 y.o. female who presents for Rash (X 1 day Rash/ allergy started to appear on whole body )        Rash  This is a new (Reports diffuse erythematous rash, itching, onset over the past day) problem. The current episode started yesterday. The problem has been gradually worsening since onset. The rash is diffuse. The rash is characterized by redness, swelling and itchiness. It is unknown (recent alcohol consumption, possible novel food exposure) if there was an exposure to a precipitant. Associated symptoms include facial edema. Pertinent negatives include no cough, fever, shortness of breath, sore throat or vomiting. Treatments tried: Reports similar symptoms with contact dermatitis lower extremity with improvement with triamcinolone. Her past medical history is significant for allergies.     PMH:  has no past medical history on file.  MEDS:   Current Outpatient Medications:     methylPREDNISolone (MEDROL DOSEPAK) 4 MG Tablet Therapy Pack, Follow schedule on package instructions., Disp: 21 Tablet, Rfl: 0    hydrOXYzine HCl (ATARAX) 25 MG Tab, Take 1 Tablet by mouth 3 times a day as needed for Itching., Disp: 30 Tablet, Rfl: 0    triamcinolone acetonide (KENALOG) 0.1 % Cream, Apply 1 Application topically 2 times a day for 14 days., Disp: 30 g, Rfl: 1    dexamethasone (DECADRON) 6 MG Tab, Take 1 Tablet by mouth every day. (Patient not taking: Reported on 8/1/2023), Disp: 3 Tablet, Rfl: 0    triamcinolone acetonide (KENALOG) 0.1 % Ointment, AAA BID x 5-7 days (Patient not taking: Reported on 8/1/2023), Disp: 80 g, Rfl: 0    omeprazole (PRILOSEC) 40 MG delayed-release capsule, Take 1 Capsule by mouth every day. (Patient not taking: Reported on 8/1/2023), Disp: 30 Capsule, Rfl: 0    sucralfate (CARAFATE) 1 GM Tab, Take 1 Tablet by mouth 3 times a day before meals. (Patient not taking: Reported on 8/1/2023), Disp: 60 Tablet, Rfl: 0  ALLERGIES: No Known Allergies  SURGHX: History  "reviewed. No pertinent surgical history.  SOCHX:  reports that she has never smoked. She has never used smokeless tobacco. She reports that she does not currently use alcohol. She reports current drug use. Frequency: 7.00 times per week. Drugs: Marijuana and Inhaled.  FH: History reviewed. No pertinent family history.  Review of Systems   Constitutional:  Negative for chills and fever.   HENT:  Negative for sore throat.    Respiratory:  Negative for cough and shortness of breath.    Gastrointestinal:  Negative for nausea and vomiting.   Musculoskeletal:  Negative for myalgias.   Skin:  Positive for itching and rash.   Neurological:  Negative for dizziness.        Objective:   BP (!) 140/72   Pulse 74   Temp 36.8 °C (98.2 °F) (Temporal)   Resp 18   Ht 1.651 m (5' 5\")   Wt 94.3 kg (208 lb)   SpO2 97%   BMI 34.61 kg/m²   Physical Exam  Vitals and nursing note reviewed.   Constitutional:       General: She is not in acute distress.     Appearance: She is well-developed.   HENT:      Head: Normocephalic and atraumatic.      Right Ear: External ear normal.      Left Ear: External ear normal.      Nose: Nose normal.      Mouth/Throat:      Mouth: Mucous membranes are moist. No angioedema.   Eyes:      Conjunctiva/sclera: Conjunctivae normal.   Cardiovascular:      Rate and Rhythm: Normal rate.   Pulmonary:      Effort: Pulmonary effort is normal. No respiratory distress.      Breath sounds: Normal breath sounds. No wheezing or rhonchi.   Abdominal:      General: There is no distension.   Musculoskeletal:         General: Normal range of motion.   Skin:     General: Skin is warm and dry.      Findings: Rash present. Rash is urticarial.             Comments: Diffuse urticarial rash with excoriation    Localized psoriatic plaque with peripheral excoriation and linear and circumferential erythema with superficial excoriation consistent with atopic dermatitis   Neurological:      General: No focal deficit present.      " Mental Status: She is alert and oriented to person, place, and time. Mental status is at baseline.      Gait: Gait (gait at baseline) normal.   Psychiatric:         Judgment: Judgment normal.           Assessment/Plan:   1. Allergic reaction, initial encounter  - methylPREDNISolone (MEDROL DOSEPAK) 4 MG Tablet Therapy Pack; Follow schedule on package instructions.  Dispense: 21 Tablet; Refill: 0    2. Pruritus  - hydrOXYzine HCl (ATARAX) 25 MG Tab; Take 1 Tablet by mouth 3 times a day as needed for Itching.  Dispense: 30 Tablet; Refill: 0    3. Atopic dermatitis, unspecified type  - triamcinolone acetonide (KENALOG) 0.1 % Cream; Apply 1 Application topically 2 times a day for 14 days.  Dispense: 30 g; Refill: 1        Medical Decision Making/Course:  In the course of preparing for this visit with review of the pertinent past medical history, recent and past clinic visits, current medications, and performing chart, immunization, medical history and medication reconciliation, and in the further course of obtaining the current history pertinent to the clinic visit today, performing an exam and evaluation, ordering and independently evaluating labs, tests  , and/or procedures, prescribing any recommended new medications as noted above, providing any pertinent counseling and education and recommending further coordination of care including recommendations for symptomatic and supportive measures and drafting work excuse letter, at least  24 minutes of total time were spent during this encounter.      Discussed close monitoring, return precautions, and supportive measures of maintaining adequate fluid hydration and caloric intake, relative rest and symptom management as needed for pain and/or fever.    Differential diagnosis, natural history, supportive care, and indications for immediate follow-up discussed.     Advised the patient to follow-up with the primary care physician for recheck, reevaluation, and consideration  of further management.    Please note that this dictation was created using voice recognition software. I have worked with consultants from the vendor as well as technical experts from [facility] to optimize the interface. I have made every reasonable attempt to correct obvious errors, but I expect that there are errors of grammar and possibly content that I did not discover before finalizing the note.

## 2023-08-12 ENCOUNTER — HOSPITAL ENCOUNTER (OUTPATIENT)
Dept: LAB | Facility: MEDICAL CENTER | Age: 48
End: 2023-08-12
Attending: FAMILY MEDICINE
Payer: COMMERCIAL

## 2023-08-12 LAB
ALBUMIN SERPL BCP-MCNC: 3.9 G/DL (ref 3.2–4.9)
ALBUMIN/GLOB SERPL: 1.3 G/DL
ALP SERPL-CCNC: 98 U/L (ref 30–99)
ALT SERPL-CCNC: 70 U/L (ref 2–50)
ANION GAP SERPL CALC-SCNC: 10 MMOL/L (ref 7–16)
APPEARANCE UR: CLEAR
AST SERPL-CCNC: 43 U/L (ref 12–45)
BASOPHILS # BLD AUTO: 0.5 % (ref 0–1.8)
BASOPHILS # BLD: 0.05 K/UL (ref 0–0.12)
BILIRUB SERPL-MCNC: 0.5 MG/DL (ref 0.1–1.5)
BILIRUB UR QL STRIP.AUTO: NEGATIVE
BUN SERPL-MCNC: 14 MG/DL (ref 8–22)
CALCIUM ALBUM COR SERPL-MCNC: 8.9 MG/DL (ref 8.5–10.5)
CALCIUM SERPL-MCNC: 8.8 MG/DL (ref 8.5–10.5)
CHLORIDE SERPL-SCNC: 105 MMOL/L (ref 96–112)
CHOLEST SERPL-MCNC: 219 MG/DL (ref 100–199)
CO2 SERPL-SCNC: 24 MMOL/L (ref 20–33)
COLOR UR: YELLOW
CREAT SERPL-MCNC: 0.86 MG/DL (ref 0.5–1.4)
EOSINOPHIL # BLD AUTO: 0.57 K/UL (ref 0–0.51)
EOSINOPHIL NFR BLD: 5.8 % (ref 0–6.9)
ERYTHROCYTE [DISTWIDTH] IN BLOOD BY AUTOMATED COUNT: 49 FL (ref 35.9–50)
ERYTHROCYTE [SEDIMENTATION RATE] IN BLOOD BY WESTERGREN METHOD: 5 MM/HOUR (ref 0–25)
FASTING STATUS PATIENT QL REPORTED: NORMAL
GFR SERPLBLD CREATININE-BSD FMLA CKD-EPI: 82 ML/MIN/1.73 M 2
GLOBULIN SER CALC-MCNC: 2.9 G/DL (ref 1.9–3.5)
GLUCOSE SERPL-MCNC: 88 MG/DL (ref 65–99)
GLUCOSE UR STRIP.AUTO-MCNC: NEGATIVE MG/DL
HCT VFR BLD AUTO: 44.3 % (ref 37–47)
HDLC SERPL-MCNC: 47 MG/DL
HGB BLD-MCNC: 14.1 G/DL (ref 12–16)
IMM GRANULOCYTES # BLD AUTO: 0.03 K/UL (ref 0–0.11)
IMM GRANULOCYTES NFR BLD AUTO: 0.3 % (ref 0–0.9)
KETONES UR STRIP.AUTO-MCNC: NEGATIVE MG/DL
LDLC SERPL CALC-MCNC: 131 MG/DL
LEUKOCYTE ESTERASE UR QL STRIP.AUTO: NEGATIVE
LYMPHOCYTES # BLD AUTO: 3.24 K/UL (ref 1–4.8)
LYMPHOCYTES NFR BLD: 33.1 % (ref 22–41)
MCH RBC QN AUTO: 29.2 PG (ref 27–33)
MCHC RBC AUTO-ENTMCNC: 31.8 G/DL (ref 32.2–35.5)
MCV RBC AUTO: 91.7 FL (ref 81.4–97.8)
MICRO URNS: NORMAL
MONOCYTES # BLD AUTO: 0.69 K/UL (ref 0–0.85)
MONOCYTES NFR BLD AUTO: 7 % (ref 0–13.4)
NEUTROPHILS # BLD AUTO: 5.21 K/UL (ref 1.82–7.42)
NEUTROPHILS NFR BLD: 53.3 % (ref 44–72)
NITRITE UR QL STRIP.AUTO: NEGATIVE
NRBC # BLD AUTO: 0 K/UL
NRBC BLD-RTO: 0 /100 WBC (ref 0–0.2)
PH UR STRIP.AUTO: 7 [PH] (ref 5–8)
PLATELET # BLD AUTO: 359 K/UL (ref 164–446)
PMV BLD AUTO: 9.4 FL (ref 9–12.9)
POTASSIUM SERPL-SCNC: 4.5 MMOL/L (ref 3.6–5.5)
PROT SERPL-MCNC: 6.8 G/DL (ref 6–8.2)
PROT UR QL STRIP: NEGATIVE MG/DL
RBC # BLD AUTO: 4.83 M/UL (ref 4.2–5.4)
RBC UR QL AUTO: NEGATIVE
RHEUMATOID FACT SER IA-ACNC: <10 IU/ML (ref 0–14)
SODIUM SERPL-SCNC: 139 MMOL/L (ref 135–145)
SP GR UR STRIP.AUTO: 1.01
T4 SERPL-MCNC: 6.8 UG/DL (ref 4–12)
TRIGL SERPL-MCNC: 206 MG/DL (ref 0–149)
TSH SERPL DL<=0.005 MIU/L-ACNC: 2.61 UIU/ML (ref 0.38–5.33)
URATE SERPL-MCNC: 5.3 MG/DL (ref 1.9–8.2)
UROBILINOGEN UR STRIP.AUTO-MCNC: 0.2 MG/DL
WBC # BLD AUTO: 9.8 K/UL (ref 4.8–10.8)

## 2023-08-12 PROCEDURE — 85652 RBC SED RATE AUTOMATED: CPT

## 2023-08-12 PROCEDURE — 85025 COMPLETE CBC W/AUTO DIFF WBC: CPT

## 2023-08-12 PROCEDURE — 84443 ASSAY THYROID STIM HORMONE: CPT

## 2023-08-12 PROCEDURE — 80053 COMPREHEN METABOLIC PANEL: CPT

## 2023-08-12 PROCEDURE — 86038 ANTINUCLEAR ANTIBODIES: CPT

## 2023-08-12 PROCEDURE — 84550 ASSAY OF BLOOD/URIC ACID: CPT

## 2023-08-12 PROCEDURE — 86431 RHEUMATOID FACTOR QUANT: CPT

## 2023-08-12 PROCEDURE — 80061 LIPID PANEL: CPT

## 2023-08-12 PROCEDURE — 36415 COLL VENOUS BLD VENIPUNCTURE: CPT

## 2023-08-12 PROCEDURE — 81003 URINALYSIS AUTO W/O SCOPE: CPT

## 2023-08-12 PROCEDURE — 84436 ASSAY OF TOTAL THYROXINE: CPT

## 2023-08-14 LAB — NUCLEAR IGG SER QL IA: NORMAL

## 2023-09-08 ENCOUNTER — HOSPITAL ENCOUNTER (OUTPATIENT)
Dept: LAB | Facility: MEDICAL CENTER | Age: 48
End: 2023-09-08
Attending: NURSE PRACTITIONER
Payer: COMMERCIAL

## 2023-09-08 ENCOUNTER — APPOINTMENT (RX ONLY)
Dept: URBAN - METROPOLITAN AREA CLINIC 20 | Facility: CLINIC | Age: 48
Setting detail: DERMATOLOGY
End: 2023-09-08

## 2023-09-08 DIAGNOSIS — L40.0 PSORIASIS VULGARIS: ICD-10-CM

## 2023-09-08 DIAGNOSIS — L40.4 GUTTATE PSORIASIS: ICD-10-CM | Status: INADEQUATELY CONTROLLED

## 2023-09-08 PROBLEM — L30.9 DERMATITIS, UNSPECIFIED: Status: ACTIVE | Noted: 2023-09-08

## 2023-09-08 LAB
BASOPHILS # BLD AUTO: 0.5 % (ref 0–1.8)
BASOPHILS # BLD: 0.05 K/UL (ref 0–0.12)
EOSINOPHIL # BLD AUTO: 0.4 K/UL (ref 0–0.51)
EOSINOPHIL NFR BLD: 4.3 % (ref 0–6.9)
ERYTHROCYTE [DISTWIDTH] IN BLOOD BY AUTOMATED COUNT: 46.4 FL (ref 35.9–50)
HCT VFR BLD AUTO: 40.3 % (ref 37–47)
HGB BLD-MCNC: 13 G/DL (ref 12–16)
IMM GRANULOCYTES # BLD AUTO: 0.04 K/UL (ref 0–0.11)
IMM GRANULOCYTES NFR BLD AUTO: 0.4 % (ref 0–0.9)
LYMPHOCYTES # BLD AUTO: 2.49 K/UL (ref 1–4.8)
LYMPHOCYTES NFR BLD: 26.7 % (ref 22–41)
MCH RBC QN AUTO: 29 PG (ref 27–33)
MCHC RBC AUTO-ENTMCNC: 32.3 G/DL (ref 32.2–35.5)
MCV RBC AUTO: 90 FL (ref 81.4–97.8)
MONOCYTES # BLD AUTO: 0.71 K/UL (ref 0–0.85)
MONOCYTES NFR BLD AUTO: 7.6 % (ref 0–13.4)
NEUTROPHILS # BLD AUTO: 5.64 K/UL (ref 1.82–7.42)
NEUTROPHILS NFR BLD: 60.5 % (ref 44–72)
NRBC # BLD AUTO: 0 K/UL
NRBC BLD-RTO: 0 /100 WBC (ref 0–0.2)
PLATELET # BLD AUTO: 377 K/UL (ref 164–446)
PMV BLD AUTO: 9.4 FL (ref 9–12.9)
RBC # BLD AUTO: 4.48 M/UL (ref 4.2–5.4)
WBC # BLD AUTO: 9.3 K/UL (ref 4.8–10.8)

## 2023-09-08 PROCEDURE — 36415 COLL VENOUS BLD VENIPUNCTURE: CPT

## 2023-09-08 PROCEDURE — 11102 TANGNTL BX SKIN SINGLE LES: CPT

## 2023-09-08 PROCEDURE — ? PRESCRIPTION

## 2023-09-08 PROCEDURE — ? ADDITIONAL NOTES

## 2023-09-08 PROCEDURE — 11103 TANGNTL BX SKIN EA SEP/ADDL: CPT

## 2023-09-08 PROCEDURE — ? COUNSELING

## 2023-09-08 PROCEDURE — ? BIOPSY BY SHAVE METHOD

## 2023-09-08 PROCEDURE — ? ORDER TESTS

## 2023-09-08 PROCEDURE — 85025 COMPLETE CBC W/AUTO DIFF WBC: CPT

## 2023-09-08 PROCEDURE — 99204 OFFICE O/P NEW MOD 45 MIN: CPT | Mod: 25

## 2023-09-08 RX ORDER — GUSELKUMAB 100 MG/ML
INJECTION SUBCUTANEOUS
Qty: 2 | Refills: 0 | Status: CANCELLED

## 2023-09-08 RX ORDER — GUSELKUMAB 100 MG/ML
INJECTION SUBCUTANEOUS
Qty: 1 | Refills: 6 | Status: CANCELLED

## 2023-09-08 ASSESSMENT — LOCATION DETAILED DESCRIPTION DERM
LOCATION DETAILED: RIGHT INDEX FINGERNAIL
LOCATION DETAILED: RIGHT INFERIOR UPPER BACK
LOCATION DETAILED: RIGHT PROXIMAL PRETIBIAL REGION
LOCATION DETAILED: LEFT ANTERIOR DISTAL THIGH
LOCATION DETAILED: LEFT INDEX FINGERNAIL
LOCATION DETAILED: LEFT INFERIOR UPPER BACK
LOCATION DETAILED: RIGHT ANTERIOR SHOULDER
LOCATION DETAILED: LEFT PROXIMAL PRETIBIAL REGION
LOCATION DETAILED: LEFT ANKLE
LOCATION DETAILED: RIGHT POSTAURICULAR CREASE
LOCATION DETAILED: RIGHT SUPERIOR UPPER BACK
LOCATION DETAILED: RIGHT DISTAL PALMAR INDEX FINGER
LOCATION DETAILED: LEFT DISTAL PALMAR RING FINGER
LOCATION DETAILED: RIGHT DORSAL FOOT
LOCATION DETAILED: LEFT INFERIOR MEDIAL LOWER BACK
LOCATION DETAILED: RIGHT ANTERIOR DISTAL THIGH
LOCATION DETAILED: LEFT ANTERIOR PROXIMAL UPPER ARM
LOCATION DETAILED: LEFT POSTERIOR EAR

## 2023-09-08 ASSESSMENT — LOCATION ZONE DERM
LOCATION ZONE: LEG
LOCATION ZONE: TRUNK
LOCATION ZONE: FINGERNAIL
LOCATION ZONE: FINGER
LOCATION ZONE: FEET
LOCATION ZONE: EAR
LOCATION ZONE: ARM

## 2023-09-08 ASSESSMENT — LOCATION SIMPLE DESCRIPTION DERM
LOCATION SIMPLE: LEFT PRETIBIAL REGION
LOCATION SIMPLE: LEFT THIGH
LOCATION SIMPLE: LEFT UPPER ARM
LOCATION SIMPLE: RIGHT EAR
LOCATION SIMPLE: LEFT INDEX FINGERNAIL
LOCATION SIMPLE: LEFT EAR
LOCATION SIMPLE: LEFT UPPER BACK
LOCATION SIMPLE: RIGHT INDEX FINGERNAIL
LOCATION SIMPLE: LEFT LOWER BACK
LOCATION SIMPLE: RIGHT THIGH
LOCATION SIMPLE: RIGHT PRETIBIAL REGION
LOCATION SIMPLE: RIGHT FOOT
LOCATION SIMPLE: LEFT ANKLE
LOCATION SIMPLE: LEFT RING FINGER
LOCATION SIMPLE: RIGHT UPPER BACK
LOCATION SIMPLE: RIGHT SHOULDER
LOCATION SIMPLE: RIGHT INDEX FINGER

## 2023-09-08 ASSESSMENT — BSA PSORIASIS: % BODY COVERED IN PSORIASIS: 40

## 2023-09-08 NOTE — PROCEDURE: ADDITIONAL NOTES
Additional Notes: Plan \\n\\n1. Get CBC\\n2. Awaiting path confirmation\\n3. Submit for Brigida
Render Risk Assessment In Note?: no
Detail Level: Simple

## 2023-09-08 NOTE — HPI: RASH (PSORIASIS)
Do You Have A Family History Of Psoriasis?: no
How Severe Is Your Psoriasis?: severe
Is This A New Presentation, Or A Follow-Up?: Rash
Additional History: Patient notes most recent flare occurred 8/1/23, was since seen by urgent care and PCP and given 2 rounds of oral prednisone. Recent lab work done by [facility] (CMP, QGOLD TB Test) were unremarkable. Patient would like to discuss initiating  biologic therapy.\\n\\nFailed topical therapy.

## 2023-09-08 NOTE — HPI: RASH
What Type Of Note Output Would You Prefer (Optional)?: Bullet Format
How Severe Is Your Rash?: severe
Is This A New Presentation, Or A Follow-Up?: Rash
Additional History: Pt last dose of prednisone was August 17th, she had two rounds.\\n\\nPer pt she was told she has psoriasis.

## 2023-09-08 NOTE — PROCEDURE: ORDER TESTS
Expected Date Of Service: 09/08/2023
Bill For Surgical Tray: no
Performing Laboratory: 0
Billing Type: Third-Party Bill

## 2023-09-15 ENCOUNTER — RX ONLY (OUTPATIENT)
Age: 48
Setting detail: RX ONLY
End: 2023-09-15

## 2023-09-15 RX ORDER — CLOBETASOL PROPIONATE 0.5 MG/G
CREAM TOPICAL
Qty: 60 | Refills: 3 | Status: ERX | COMMUNITY
Start: 2023-09-15

## 2023-09-15 RX ORDER — HYDROXYZINE HYDROCHLORIDE 25 MG/1
TABLET, FILM COATED ORAL
Qty: 60 | Refills: 0 | Status: ERX

## 2023-09-21 ENCOUNTER — APPOINTMENT (RX ONLY)
Dept: URBAN - METROPOLITAN AREA CLINIC 20 | Facility: CLINIC | Age: 48
Setting detail: DERMATOLOGY
End: 2023-09-21

## 2023-09-21 DIAGNOSIS — L40.0 PSORIASIS VULGARIS: ICD-10-CM

## 2023-09-21 PROCEDURE — ? TREMFYA INJECTION

## 2023-09-21 PROCEDURE — 96372 THER/PROPH/DIAG INJ SC/IM: CPT

## 2023-09-21 ASSESSMENT — LOCATION DETAILED DESCRIPTION DERM: LOCATION DETAILED: LEFT DISTAL POSTERIOR UPPER ARM

## 2023-09-21 ASSESSMENT — LOCATION ZONE DERM: LOCATION ZONE: ARM

## 2023-09-21 ASSESSMENT — LOCATION SIMPLE DESCRIPTION DERM: LOCATION SIMPLE: LEFT POSTERIOR UPPER ARM

## 2023-09-21 NOTE — PROCEDURE: TREMFYA INJECTION
Tremfya Amount: 100 mg
Detail Level: None
Syringe Size Used (Required For Enhanced Ndc): 100 mg/ml One-Press Injector
Date Of Next Injection: Other Time Frame (Enter Below)
Other Time Frame Value: 4 weeks
Lot # (Optional): [id number]
Expiration Date (Optional): 6/24
Administered By (Optional): Shaye CÁRDENAS
Consent: The risks of pain and injection site reactions were reviewed with the patient prior to the injection.
Include J-Code In Bill: No
J-Code: 
Use Enhanced Ndc?: Yes
Ndc (100 Mg/Mg Injector): 57854-914-43
Ndc (100 Mg/Mg Syringe): 86082-027-24

## 2023-10-19 ENCOUNTER — RX ONLY (OUTPATIENT)
Age: 48
Setting detail: RX ONLY
End: 2023-10-19

## 2023-10-19 RX ORDER — HYDROXYZINE HYDROCHLORIDE 25 MG/1
TABLET, FILM COATED ORAL
Qty: 60 | Refills: 0 | Status: CANCELLED
Stop reason: CLARIF

## 2024-04-15 ENCOUNTER — APPOINTMENT (OUTPATIENT)
Dept: RADIOLOGY | Facility: MEDICAL CENTER | Age: 49
End: 2024-04-15
Attending: FAMILY MEDICINE
Payer: COMMERCIAL

## 2024-04-15 DIAGNOSIS — J45.901 EXTRINSIC ASTHMA WITH ACUTE EXACERBATION, UNSPECIFIED ASTHMA SEVERITY, UNSPECIFIED WHETHER PERSISTENT: ICD-10-CM

## 2024-04-15 PROCEDURE — 71046 X-RAY EXAM CHEST 2 VIEWS: CPT

## 2024-06-11 ENCOUNTER — HOSPITAL ENCOUNTER (EMERGENCY)
Facility: MEDICAL CENTER | Age: 49
End: 2024-06-11
Attending: EMERGENCY MEDICINE
Payer: COMMERCIAL

## 2024-06-11 ENCOUNTER — APPOINTMENT (RX ONLY)
Dept: URBAN - METROPOLITAN AREA CLINIC 20 | Facility: CLINIC | Age: 49
Setting detail: DERMATOLOGY
End: 2024-06-11

## 2024-06-11 VITALS
OXYGEN SATURATION: 96 % | SYSTOLIC BLOOD PRESSURE: 140 MMHG | DIASTOLIC BLOOD PRESSURE: 70 MMHG | BODY MASS INDEX: 37.69 KG/M2 | TEMPERATURE: 98.4 F | WEIGHT: 226.19 LBS | HEART RATE: 83 BPM | RESPIRATION RATE: 18 BRPM | HEIGHT: 65 IN

## 2024-06-11 DIAGNOSIS — T78.40XA ALLERGIC REACTION, INITIAL ENCOUNTER: ICD-10-CM

## 2024-06-11 DIAGNOSIS — L23.2 ALLERGIC CONTACT DERMATITIS DUE TO COSMETICS: ICD-10-CM

## 2024-06-11 DIAGNOSIS — T78.40 ALLERGY, UNSPECIFIED: ICD-10-CM

## 2024-06-11 DIAGNOSIS — L50.9 HIVES: ICD-10-CM

## 2024-06-11 PROCEDURE — 99212 OFFICE O/P EST SF 10 MIN: CPT

## 2024-06-11 PROCEDURE — ? COUNSELING

## 2024-06-11 PROCEDURE — 700105 HCHG RX REV CODE 258: Performed by: EMERGENCY MEDICINE

## 2024-06-11 PROCEDURE — 700111 HCHG RX REV CODE 636 W/ 250 OVERRIDE (IP): Performed by: EMERGENCY MEDICINE

## 2024-06-11 PROCEDURE — 96375 TX/PRO/DX INJ NEW DRUG ADDON: CPT

## 2024-06-11 PROCEDURE — 96374 THER/PROPH/DIAG INJ IV PUSH: CPT

## 2024-06-11 PROCEDURE — ? ADDITIONAL NOTES

## 2024-06-11 PROCEDURE — 96372 THER/PROPH/DIAG INJ SC/IM: CPT

## 2024-06-11 PROCEDURE — 99284 EMERGENCY DEPT VISIT MOD MDM: CPT

## 2024-06-11 RX ORDER — CETIRIZINE HYDROCHLORIDE 10 MG/1
10 TABLET ORAL PRN
COMMUNITY

## 2024-06-11 RX ORDER — DIPHENHYDRAMINE HYDROCHLORIDE 50 MG/ML
50 INJECTION INTRAMUSCULAR; INTRAVENOUS ONCE
Status: COMPLETED | OUTPATIENT
Start: 2024-06-11 | End: 2024-06-11

## 2024-06-11 RX ORDER — EPINEPHRINE 1 MG/ML(1)
0.3 AMPUL (ML) INJECTION ONCE
Status: COMPLETED | OUTPATIENT
Start: 2024-06-11 | End: 2024-06-11

## 2024-06-11 RX ORDER — SODIUM CHLORIDE, SODIUM LACTATE, POTASSIUM CHLORIDE, CALCIUM CHLORIDE 600; 310; 30; 20 MG/100ML; MG/100ML; MG/100ML; MG/100ML
1000 INJECTION, SOLUTION INTRAVENOUS ONCE
Status: COMPLETED | OUTPATIENT
Start: 2024-06-11 | End: 2024-06-11

## 2024-06-11 RX ORDER — METHYLPREDNISOLONE SODIUM SUCCINATE 125 MG/2ML
62.5 INJECTION, POWDER, LYOPHILIZED, FOR SOLUTION INTRAMUSCULAR; INTRAVENOUS ONCE
Status: COMPLETED | OUTPATIENT
Start: 2024-06-11 | End: 2024-06-11

## 2024-06-11 RX ORDER — GUSELKUMAB 100 MG/ML
100 INJECTION SUBCUTANEOUS
COMMUNITY

## 2024-06-11 RX ORDER — DEXAMETHASONE SODIUM PHOSPHATE 4 MG/ML
10 INJECTION, SOLUTION INTRA-ARTICULAR; INTRALESIONAL; INTRAMUSCULAR; INTRAVENOUS; SOFT TISSUE ONCE
Status: COMPLETED | OUTPATIENT
Start: 2024-06-11 | End: 2024-06-11

## 2024-06-11 RX ADMIN — DEXAMETHASONE SODIUM PHOSPHATE 10 MG: 4 INJECTION, SOLUTION INTRAMUSCULAR; INTRAVENOUS at 15:21

## 2024-06-11 RX ADMIN — METHYLPREDNISOLONE SODIUM SUCCINATE 62.5 MG: 125 INJECTION, POWDER, FOR SOLUTION INTRAMUSCULAR; INTRAVENOUS at 15:17

## 2024-06-11 RX ADMIN — DIPHENHYDRAMINE HYDROCHLORIDE 50 MG: 50 INJECTION, SOLUTION INTRAMUSCULAR; INTRAVENOUS at 15:14

## 2024-06-11 RX ADMIN — EPINEPHRINE 0.3 MG: 1 INJECTION INTRAMUSCULAR; INTRAVENOUS; SUBCUTANEOUS at 15:11

## 2024-06-11 RX ADMIN — SODIUM CHLORIDE, POTASSIUM CHLORIDE, SODIUM LACTATE AND CALCIUM CHLORIDE 1000 ML: 600; 310; 30; 20 INJECTION, SOLUTION INTRAVENOUS at 15:25

## 2024-06-11 ASSESSMENT — LOCATION ZONE DERM
LOCATION ZONE: ARM
LOCATION ZONE: FACE

## 2024-06-11 ASSESSMENT — LOCATION SIMPLE DESCRIPTION DERM
LOCATION SIMPLE: LEFT UPPER ARM
LOCATION SIMPLE: LEFT CHEEK

## 2024-06-11 ASSESSMENT — LOCATION DETAILED DESCRIPTION DERM
LOCATION DETAILED: LEFT ANTERIOR DISTAL UPPER ARM
LOCATION DETAILED: LEFT INFERIOR CENTRAL MALAR CHEEK

## 2024-06-11 ASSESSMENT — FIBROSIS 4 INDEX: FIB4 SCORE: 0.68

## 2024-06-11 ASSESSMENT — PAIN DESCRIPTION - PAIN TYPE: TYPE: ACUTE PAIN

## 2024-06-11 NOTE — ED PROVIDER NOTES
ER Provider Note    Scribed for Aurelio Sandy M.D. by Denise Villa. 6/11/2024  2:51 PM    Primary Care Provider: Dwayne Lay M.D.    CHIEF COMPLAINT  Chief Complaint   Patient presents with    Facial Swelling     Started yesterday  During housework    Hives     Arms and chest     EXTERNAL RECORDS REVIEWED  No visits within our system of this calendar year, no visits with listed established PMD.    HPI/ROS  LIMITATION TO HISTORY   Select: : None    OUTSIDE HISTORIAN(S):  None    Ann Munoz is a 48 y.o. female who presents to the ED complaining of allergic reaction onset this morning. The patient states that she put a lotion for her psoriasis on her arms and face last night and woke up this morning with redness and swelling over the area. The patient states that her skin is itching and burning. She has used the lotion before but states it has been around a year since the last time she used it. Since the last time she used the lotion she has been taking tremfya shots. She denies any tongue swelling or difficulty breathing. The patient denies any other known allergies.     PAST MEDICAL HISTORY  Past Medical History:   Diagnosis Date    Hypertension     Psoriasis        SURGICAL HISTORY  History reviewed. No pertinent surgical history.    FAMILY HISTORY  History reviewed. No pertinent family history.    SOCIAL HISTORY   reports that she has never smoked. She has never used smokeless tobacco. She reports current alcohol use. She reports current drug use. Frequency: 7.00 times per week. Drugs: Marijuana and Inhaled.    CURRENT MEDICATIONS  Discharge Medication List as of 6/11/2024  4:28 PM        CONTINUE these medications which have NOT CHANGED    Details   Guselkumab (TREMFYA) 100 MG/ML Solution Pen-injector Inject 100 mg under the skin every 8 weeks. Last injection was on 5/29/2024, Historical Med      Ibuprofen (MOTRIN PO) Take 3 Tablets by mouth 2 times a day as needed (For pain)., Historical Med  "     cetirizine (ZYRTEC) 10 MG Tab Take 10 mg by mouth as needed for Allergies (Swelling in face and hives on arms)., Historical Med             ALLERGIES  Patient has no known allergies.    PHYSICAL EXAM  VITAL SIGNS: BP (!) 173/105   Pulse 76   Temp 36.9 °C (98.5 °F) (Temporal)   Resp 15   Ht 1.651 m (5' 5\")   Wt 103 kg (226 lb 3.1 oz)   LMP 06/04/2024   SpO2 94%   BMI 37.64 kg/m²   Pulse ox interpretation: I interpret this pulse ox as normal.  Constitutional: Alert in no apparent distress.  HENT: No signs of trauma, Bilateral external ears normal, Nose normal. No tongue swelling or mucosal edema.  Eyes: Conjunctiva normal, Non-icteric.   Neck: Normal range of motion, Supple, No stridor.   Lymphatic: No lymphadenopathy noted.   Cardiovascular: Regular rate and rhythm, no murmurs.   Thorax & Lungs: Normal breath sounds, No respiratory distress, No wheezing  Abdomen: Bowel sounds normal, Soft, No tenderness, No masses, No pulsatile masses. No peritoneal signs.  Skin: Warm, Dry, Erythema and edema to face and neck with punctate, rough rash to top of chest and arms where lotion was applied  Back: No midline bony tenderness.  Extremities: Intact distal pulses, No edema, No cyanosis.  Musculoskeletal: Good range of motion in all major joints. No or major deformities noted.   Neurologic: Alert , Normal motor function, Normal sensory function, No focal deficits noted.   Psychiatric: Affect normal, Judgment normal, Mood normal.       COURSE & MEDICAL DECISION MAKING     INITIAL ASSESSMENT, COURSE AND PLAN  Care Narrative:     2:51 PM Patient presents to the ED with allergic reaction over her arms and face.  This is a very impressive skin reaction, but no second body system involved, so not technically anaphylaxis, but will still have more immediate relief from intramuscular epinephrine, which we discussed and she agreed upon.  Patient evaluated at bedside and discussed plan of care, including medication treatment. " Patient will be treated with LR 1,000 mL, Benadryl 50 mg, Decadron 10 mg Solu-Medrol 62.5 mg, and Epinephrine 0.3 mg. Patient with contact dermatitis with component of hives.     3:45 PM - I reevaluated the patient at bedside. The patient informs me they feel improved following medication  administration. I discussed plan for discharge and follow up as outlined below. The patient is stable for discharge at this time and will return for any new or worsening symptoms. The patient was given an opportunity to ask questions. The patient verbalizes understanding and support with my plan for discharge.      Hydration: Based on the patient's presentation of Other severe allergic reaction the patient was given IV fluids. IV Hydration was used because oral hydration was not adequate alone. Upon recheck following hydration, the patient was feeling improved.      DISPOSITION AND DISCUSSIONS  I have discussed management of the patient with the following physicians and LUCHO's:  None    Discussion of management with other QHP or appropriate source(s): None     Escalation of care considered, and ultimately not performed: diagnostic imaging considered, but the patient had no respiratory symptoms.      Decision tools and prescription drugs considered including, but not limited to: Medication modification considered, but with no history of anaphylaxis, no history of food allergies, and an isolated cutaneous reaction, EpiPen prescription is not necessary .    The patient will return for new or worsening symptoms and is stable at the time of discharge.    The patient is referred to a primary physician for blood pressure management, diabetic screening, and for all other preventative health concerns.    DISPOSITION:  Patient will be discharged home in stable condition.    FINAL DIAGNOSIS  1. Allergic contact dermatitis due to cosmetics    2. Hives    3. Allergic reaction, initial encounter       I, Denise Villa (Isabel), sachin hager  for, and in the presence of, Aurelio Sandy M.D..    Electronically signed by: Denies Villa (Scribe), 6/11/2024    IAurelio M.D. personally performed the services described in this documentation, as scribed by Denise Villa in my presence, and it is both accurate and complete.

## 2024-06-11 NOTE — PROCEDURE: ADDITIONAL NOTES
Render Risk Assessment In Note?: no
Detail Level: Simple
Additional Notes: Pt was suppose to f/u 10/2023 cancelled appt.\\n\\nPresented as a walk in today c/o swelling to face.  I directed her to ER due to symptoms. Pt denies SOB, lip, tongue or throat swelling.  She stated she would drive herself to ER.\\n\\nRegarding Tremfya pt must f/u to review proper dosing as well as labs she is due for CBC,CMP.

## 2024-06-11 NOTE — ED TRIAGE NOTES
"Chief Complaint   Patient presents with    Facial Swelling     Started yesterday  During housework    Hives     Arms and chest     BP (!) 173/105   Pulse 76   Temp 36.9 °C (98.5 °F) (Temporal)   Resp 15   Ht 1.651 m (5' 5\")   Wt 103 kg (226 lb 3.1 oz)   LMP 06/04/2024   SpO2 94%   BMI 37.64 kg/m²     Pt ambulated to ED w/ visitor for possible allergic reaction to unknown source.  Pt noticed facial swelling last night, has gotten progressively worse this am and developed hives this am over arms and chest.  Pt took Zyrtec this am for seasonal allergies w/o relief.  "

## 2024-06-11 NOTE — HPI: RASH (ALLERGIC CONTACT DERMATITIS)
How Severe Is Your Rash?: severe
Is This A New Presentation, Or A Follow-Up?: Evaluation for Possible Contact Allergy
Additional History: Pt developed facial swelling last night, followed by body rash today.  Denies new meds, product, or hx or allergic reaction like this.  She is on Tremfya for psoriasis, last injection was 5/29/2024.  While collecting hx pt was confused about frequency of injections after loading dose.  She thinks she might be using monthly, which is not what was Rx maintenance  is every 8 weeks. \\n\\n\\n\\n\\n\\n

## 2024-06-11 NOTE — ED NOTES
Medication history reviewed with pt. Med rec is complete.  Allergies reviewed,per pt Interviewed pt with  at bedside with permission from pt.    Patient has not had any outpatient antibiotics in the last 30 days.    Pt is not on any anticoagulants

## 2024-06-11 NOTE — DISCHARGE INSTRUCTIONS
Continue oral Benadryl, 25 to 50 mg every 6 hours, and you can also buy Benadryl ointment to use on the skin.  Also take a daily nondrowsy allergy medication such as Zyrtec or Claritin.  You are given a long-acting steroid which should help keep symptoms under control.  Return if you find you are getting worse instead of continuing to improve, especially with shortness of breath or swelling inside your mouth or other new concerning symptoms.

## 2024-06-18 ENCOUNTER — APPOINTMENT (RX ONLY)
Dept: URBAN - METROPOLITAN AREA CLINIC 20 | Facility: CLINIC | Age: 49
Setting detail: DERMATOLOGY
End: 2024-06-18

## 2024-06-18 DIAGNOSIS — T78.40 ALLERGY, UNSPECIFIED: ICD-10-CM

## 2024-06-18 DIAGNOSIS — L40.0 PSORIASIS VULGARIS: ICD-10-CM

## 2024-06-18 PROBLEM — T78.40XA ALLERGY, UNSPECIFIED, INITIAL ENCOUNTER: Status: ACTIVE | Noted: 2024-06-18

## 2024-06-18 PROCEDURE — ? COUNSELING

## 2024-06-18 PROCEDURE — 99214 OFFICE O/P EST MOD 30 MIN: CPT

## 2024-06-18 PROCEDURE — ? ADDITIONAL NOTES

## 2024-06-18 PROCEDURE — ? PRESCRIPTION SAMPLES PROVIDED

## 2024-06-18 PROCEDURE — ? ORDER TESTS

## 2024-06-18 ASSESSMENT — LOCATION ZONE DERM: LOCATION ZONE: HAND

## 2024-06-18 ASSESSMENT — LOCATION DETAILED DESCRIPTION DERM: LOCATION DETAILED: RIGHT RADIAL DORSAL HAND

## 2024-06-18 ASSESSMENT — LOCATION SIMPLE DESCRIPTION DERM: LOCATION SIMPLE: RIGHT HAND

## 2024-06-18 NOTE — PROCEDURE: ORDER TESTS
Expected Date Of Service: 06/21/2024
Performing Laboratory: 0
Billing Type: Third-Party Bill
Bill For Surgical Tray: no

## 2024-06-18 NOTE — PROCEDURE: ADDITIONAL NOTES
Render Risk Assessment In Note?: no
Detail Level: Simple
Additional Notes: Plan\\n\\n1. Failed Tremfya, conitnue until cosentyx approved \\n2. Start cosentyx once approved \\n3. Ok to use topical for breakthrough\\n\\n-discussed most likely recent ACD was related to a topical product no biologic, pt had been using correctly for 3 months w/o issue.
Additional Notes: Pt went to ER and treated w/ steriod successfully, currently w/o reaction.\\n\\nPlan\\n1. Refer to Dr. Crawley for allergy testing \\n2. Pt thinks it may have been related to her [facility] psoriasis lotion,\\n3. Denies reaction reoccuring\\n4. Discussed epipen

## 2024-06-18 NOTE — HPI: RASH (ALLERGIC CONTACT DERMATITIS)
Is This A New Presentation, Or A Follow-Up?: Follow Up Allergic Contact Dermatitis
Additional History: Pt thinks the reaction may have been from a medicated lotion from [employer] for psoriasis.  She applied to face the following day woke up with reaction to face and arms. \\n\\nPt went to ER for Benadryl, Solumedrol, with + results.

## 2024-06-18 NOTE — HPI: MEDICATION (TREMFYA)
How Severe Is It?: severe
Is This A New Presentation, Or A Follow-Up?: Follow Up Tremfya
Additional History: Pt states her psoriasis responded to Tremphya during loading doses, however over the last 2 months she has had breakthrough rash.

## 2024-06-20 ENCOUNTER — RX ONLY (OUTPATIENT)
Age: 49
Setting detail: RX ONLY
End: 2024-06-20

## 2024-06-20 RX ORDER — SECUKINUMAB 300 MG/2ML
INJECTION SUBCUTANEOUS
Qty: 2 | Refills: 0 | Status: ERX

## 2024-06-20 RX ORDER — SECUKINUMAB 300 MG/2ML
INJECTION SUBCUTANEOUS
Qty: 2 | Refills: 4 | Status: ERX | COMMUNITY
Start: 2024-06-20

## 2024-07-08 ENCOUNTER — HOSPITAL ENCOUNTER (OUTPATIENT)
Dept: LAB | Facility: MEDICAL CENTER | Age: 49
End: 2024-07-08
Attending: NURSE PRACTITIONER
Payer: COMMERCIAL

## 2024-07-08 LAB
ALBUMIN SERPL BCP-MCNC: 4.3 G/DL (ref 3.2–4.9)
ALBUMIN/GLOB SERPL: 1.3 G/DL
ALP SERPL-CCNC: 104 U/L (ref 30–99)
ALT SERPL-CCNC: 81 U/L (ref 2–50)
ANION GAP SERPL CALC-SCNC: 12 MMOL/L (ref 7–16)
AST SERPL-CCNC: 71 U/L (ref 12–45)
BILIRUB SERPL-MCNC: 0.2 MG/DL (ref 0.1–1.5)
BUN SERPL-MCNC: 8 MG/DL (ref 8–22)
CALCIUM ALBUM COR SERPL-MCNC: 8.9 MG/DL (ref 8.5–10.5)
CALCIUM SERPL-MCNC: 9.1 MG/DL (ref 8.5–10.5)
CHLORIDE SERPL-SCNC: 105 MMOL/L (ref 96–112)
CO2 SERPL-SCNC: 23 MMOL/L (ref 20–33)
CREAT SERPL-MCNC: 0.71 MG/DL (ref 0.5–1.4)
ERYTHROCYTE [DISTWIDTH] IN BLOOD BY AUTOMATED COUNT: 44.6 FL (ref 35.9–50)
GFR SERPLBLD CREATININE-BSD FMLA CKD-EPI: 103 ML/MIN/1.73 M 2
GLOBULIN SER CALC-MCNC: 3.4 G/DL (ref 1.9–3.5)
GLUCOSE SERPL-MCNC: 92 MG/DL (ref 65–99)
HCT VFR BLD AUTO: 43.5 % (ref 37–47)
HGB BLD-MCNC: 14.1 G/DL (ref 12–16)
MCH RBC QN AUTO: 29.7 PG (ref 27–33)
MCHC RBC AUTO-ENTMCNC: 32.4 G/DL (ref 32.2–35.5)
MCV RBC AUTO: 91.8 FL (ref 81.4–97.8)
PLATELET # BLD AUTO: 340 K/UL (ref 164–446)
PMV BLD AUTO: 9.8 FL (ref 9–12.9)
POTASSIUM SERPL-SCNC: 4 MMOL/L (ref 3.6–5.5)
PROT SERPL-MCNC: 7.7 G/DL (ref 6–8.2)
RBC # BLD AUTO: 4.74 M/UL (ref 4.2–5.4)
SODIUM SERPL-SCNC: 140 MMOL/L (ref 135–145)
WBC # BLD AUTO: 8.1 K/UL (ref 4.8–10.8)

## 2024-07-08 PROCEDURE — 36415 COLL VENOUS BLD VENIPUNCTURE: CPT

## 2024-07-08 PROCEDURE — 86480 TB TEST CELL IMMUN MEASURE: CPT

## 2024-07-08 PROCEDURE — 80053 COMPREHEN METABOLIC PANEL: CPT

## 2024-07-08 PROCEDURE — 85027 COMPLETE CBC AUTOMATED: CPT

## 2024-07-10 LAB
GAMMA INTERFERON BACKGROUND BLD IA-ACNC: 0.07 IU/ML
M TB IFN-G BLD-IMP: NEGATIVE
M TB IFN-G CD4+ BCKGRND COR BLD-ACNC: 0.01 IU/ML
MITOGEN IGNF BCKGRD COR BLD-ACNC: >10 IU/ML
QFT TB2 - NIL TBQ2: 0 IU/ML

## 2024-07-22 ENCOUNTER — HOSPITAL ENCOUNTER (EMERGENCY)
Facility: MEDICAL CENTER | Age: 49
End: 2024-07-22
Attending: EMERGENCY MEDICINE
Payer: COMMERCIAL

## 2024-07-22 VITALS
WEIGHT: 222 LBS | HEIGHT: 65 IN | TEMPERATURE: 97 F | BODY MASS INDEX: 36.99 KG/M2 | DIASTOLIC BLOOD PRESSURE: 95 MMHG | OXYGEN SATURATION: 94 % | HEART RATE: 70 BPM | RESPIRATION RATE: 16 BRPM | SYSTOLIC BLOOD PRESSURE: 149 MMHG

## 2024-07-22 DIAGNOSIS — T78.40XA ALLERGIC REACTION, INITIAL ENCOUNTER: ICD-10-CM

## 2024-07-22 DIAGNOSIS — R06.2 WHEEZING: ICD-10-CM

## 2024-07-22 DIAGNOSIS — R22.0 FACIAL SWELLING: ICD-10-CM

## 2024-07-22 PROCEDURE — 700102 HCHG RX REV CODE 250 W/ 637 OVERRIDE(OP): Performed by: EMERGENCY MEDICINE

## 2024-07-22 PROCEDURE — 94664 DEMO&/EVAL PT USE INHALER: CPT

## 2024-07-22 PROCEDURE — 700111 HCHG RX REV CODE 636 W/ 250 OVERRIDE (IP): Mod: JZ | Performed by: EMERGENCY MEDICINE

## 2024-07-22 PROCEDURE — A9270 NON-COVERED ITEM OR SERVICE: HCPCS | Performed by: EMERGENCY MEDICINE

## 2024-07-22 PROCEDURE — 99283 EMERGENCY DEPT VISIT LOW MDM: CPT

## 2024-07-22 RX ORDER — CETIRIZINE HYDROCHLORIDE 10 MG/1
10 TABLET ORAL ONCE
Status: COMPLETED | OUTPATIENT
Start: 2024-07-22 | End: 2024-07-22

## 2024-07-22 RX ORDER — DEXAMETHASONE SODIUM PHOSPHATE 10 MG/ML
10 INJECTION, SOLUTION INTRAMUSCULAR; INTRAVENOUS ONCE
Status: COMPLETED | OUTPATIENT
Start: 2024-07-22 | End: 2024-07-22

## 2024-07-22 RX ORDER — FAMOTIDINE 20 MG/1
20 TABLET, FILM COATED ORAL ONCE
Status: COMPLETED | OUTPATIENT
Start: 2024-07-22 | End: 2024-07-22

## 2024-07-22 RX ORDER — ALBUTEROL SULFATE 90 UG/1
2 AEROSOL, METERED RESPIRATORY (INHALATION) ONCE
Status: COMPLETED | OUTPATIENT
Start: 2024-07-22 | End: 2024-07-22

## 2024-07-22 RX ORDER — ALBUTEROL SULFATE 90 UG/1
2 AEROSOL, METERED RESPIRATORY (INHALATION) EVERY 6 HOURS PRN
Qty: 8.5 G | Refills: 0 | Status: SHIPPED | OUTPATIENT
Start: 2024-07-22

## 2024-07-22 RX ADMIN — ALBUTEROL SULFATE 2 PUFF: 90 AEROSOL, METERED RESPIRATORY (INHALATION) at 12:16

## 2024-07-22 RX ADMIN — CETIRIZINE HYDROCHLORIDE 10 MG: 10 TABLET, FILM COATED ORAL at 12:18

## 2024-07-22 RX ADMIN — DEXAMETHASONE SODIUM PHOSPHATE 10 MG: 10 INJECTION, SOLUTION INTRAMUSCULAR; INTRAVENOUS at 11:00

## 2024-07-22 RX ADMIN — FAMOTIDINE 20 MG: 20 TABLET ORAL at 12:18

## 2024-07-22 ASSESSMENT — FIBROSIS 4 INDEX: FIB4 SCORE: 1.16

## 2024-07-23 ENCOUNTER — APPOINTMENT (RX ONLY)
Dept: URBAN - METROPOLITAN AREA CLINIC 20 | Facility: CLINIC | Age: 49
Setting detail: DERMATOLOGY
End: 2024-07-23

## 2024-07-23 DIAGNOSIS — L40.0 PSORIASIS VULGARIS: ICD-10-CM

## 2024-07-23 PROCEDURE — ? ORDER TESTS

## 2024-07-23 NOTE — PROCEDURE: ORDER TESTS
Expected Date Of Service: 07/23/2024
Performing Laboratory: 0
Billing Type: Third-Party Bill
Bill For Surgical Tray: no

## 2024-08-30 ENCOUNTER — HOSPITAL ENCOUNTER (OUTPATIENT)
Dept: LAB | Facility: MEDICAL CENTER | Age: 49
End: 2024-08-30
Attending: INTERNAL MEDICINE
Payer: COMMERCIAL

## 2024-08-30 LAB
BASOPHILS # BLD AUTO: 0.5 % (ref 0–1.8)
BASOPHILS # BLD: 0.04 K/UL (ref 0–0.12)
CRP SERPL HS-MCNC: 0.68 MG/DL (ref 0–0.75)
EOSINOPHIL # BLD AUTO: 0.5 K/UL (ref 0–0.51)
EOSINOPHIL NFR BLD: 6.1 % (ref 0–6.9)
ERYTHROCYTE [DISTWIDTH] IN BLOOD BY AUTOMATED COUNT: 41.9 FL (ref 35.9–50)
ERYTHROCYTE [SEDIMENTATION RATE] IN BLOOD BY WESTERGREN METHOD: 10 MM/HOUR (ref 0–25)
HCT VFR BLD AUTO: 40 % (ref 37–47)
HGB BLD-MCNC: 13.3 G/DL (ref 12–16)
IMM GRANULOCYTES # BLD AUTO: 0.02 K/UL (ref 0–0.11)
IMM GRANULOCYTES NFR BLD AUTO: 0.2 % (ref 0–0.9)
LYMPHOCYTES # BLD AUTO: 2.42 K/UL (ref 1–4.8)
LYMPHOCYTES NFR BLD: 29.5 % (ref 22–41)
MCH RBC QN AUTO: 30.4 PG (ref 27–33)
MCHC RBC AUTO-ENTMCNC: 33.3 G/DL (ref 32.2–35.5)
MCV RBC AUTO: 91.3 FL (ref 81.4–97.8)
MONOCYTES # BLD AUTO: 0.5 K/UL (ref 0–0.85)
MONOCYTES NFR BLD AUTO: 6.1 % (ref 0–13.4)
NEUTROPHILS # BLD AUTO: 4.72 K/UL (ref 1.82–7.42)
NEUTROPHILS NFR BLD: 57.6 % (ref 44–72)
NRBC # BLD AUTO: 0 K/UL
NRBC BLD-RTO: 0 /100 WBC (ref 0–0.2)
PLATELET # BLD AUTO: 284 K/UL (ref 164–446)
PMV BLD AUTO: 9.5 FL (ref 9–12.9)
RBC # BLD AUTO: 4.38 M/UL (ref 4.2–5.4)
T4 FREE SERPL-MCNC: 0.94 NG/DL (ref 0.93–1.7)
THYROPEROXIDASE AB SERPL-ACNC: 10 IU/ML (ref 0–9)
TSH SERPL-ACNC: 1.28 UIU/ML (ref 0.35–5.5)
WBC # BLD AUTO: 8.2 K/UL (ref 4.8–10.8)

## 2024-08-30 PROCEDURE — 86038 ANTINUCLEAR ANTIBODIES: CPT

## 2024-08-30 PROCEDURE — 85025 COMPLETE CBC W/AUTO DIFF WBC: CPT

## 2024-08-30 PROCEDURE — 83516 IMMUNOASSAY NONANTIBODY: CPT | Mod: 91

## 2024-08-30 PROCEDURE — 85652 RBC SED RATE AUTOMATED: CPT

## 2024-08-30 PROCEDURE — 88184 FLOWCYTOMETRY/ TC 1 MARKER: CPT

## 2024-08-30 PROCEDURE — 84443 ASSAY THYROID STIM HORMONE: CPT

## 2024-08-30 PROCEDURE — 83520 IMMUNOASSAY QUANT NOS NONAB: CPT

## 2024-08-30 PROCEDURE — 86140 C-REACTIVE PROTEIN: CPT

## 2024-08-30 PROCEDURE — 84439 ASSAY OF FREE THYROXINE: CPT

## 2024-08-30 PROCEDURE — 82785 ASSAY OF IGE: CPT

## 2024-08-30 PROCEDURE — 86800 THYROGLOBULIN ANTIBODY: CPT

## 2024-08-30 PROCEDURE — 36415 COLL VENOUS BLD VENIPUNCTURE: CPT

## 2024-08-30 PROCEDURE — 83088 ASSAY OF HISTAMINE: CPT

## 2024-08-30 PROCEDURE — 86376 MICROSOMAL ANTIBODY EACH: CPT

## 2024-08-30 PROCEDURE — 88185 FLOWCYTOMETRY/TC ADD-ON: CPT

## 2024-09-02 LAB
IGE SERPL-ACNC: 468 KU/L
NUCLEAR IGG SER QL IA: NORMAL
THYROGLOB AB SERPL-ACNC: <0.9 IU/ML (ref 0–4)
TRYPTASE SERPL-MCNC: 5.4 UG/L

## 2024-09-03 LAB
MYELOPEROXIDASE AB SER-ACNC: 0 AU/ML (ref 0–19)
PROTEINASE3 AB SER-ACNC: 0 AU/ML (ref 0–19)
URTIIND BASO ACT Q4770: 4 %

## 2024-09-05 LAB — HISTAMINE SERPL-SCNC: <8 NMOL/L (ref 0–8)

## 2024-09-13 ENCOUNTER — RX ONLY (OUTPATIENT)
Age: 49
Setting detail: RX ONLY
End: 2024-09-13

## 2024-09-13 RX ORDER — GUSELKUMAB 100 MG/ML
INJECTION SUBCUTANEOUS
Qty: 1 | Refills: 4 | Status: ERX | COMMUNITY
Start: 2024-09-13

## 2024-11-27 ENCOUNTER — APPOINTMENT (OUTPATIENT)
Dept: RADIOLOGY | Facility: MEDICAL CENTER | Age: 49
End: 2024-11-27
Attending: EMERGENCY MEDICINE
Payer: COMMERCIAL

## 2024-11-27 ENCOUNTER — PHARMACY VISIT (OUTPATIENT)
Dept: PHARMACY | Facility: MEDICAL CENTER | Age: 49
End: 2024-11-27
Payer: MEDICARE

## 2024-11-27 ENCOUNTER — HOSPITAL ENCOUNTER (EMERGENCY)
Facility: MEDICAL CENTER | Age: 49
End: 2024-11-27
Attending: EMERGENCY MEDICINE
Payer: COMMERCIAL

## 2024-11-27 VITALS
OXYGEN SATURATION: 97 % | TEMPERATURE: 98.5 F | DIASTOLIC BLOOD PRESSURE: 67 MMHG | HEIGHT: 65 IN | BODY MASS INDEX: 36.99 KG/M2 | SYSTOLIC BLOOD PRESSURE: 111 MMHG | HEART RATE: 81 BPM | RESPIRATION RATE: 16 BRPM | WEIGHT: 222 LBS

## 2024-11-27 DIAGNOSIS — E87.6 HYPOKALEMIA: ICD-10-CM

## 2024-11-27 DIAGNOSIS — F10.930 ALCOHOL WITHDRAWAL SYNDROME WITHOUT COMPLICATION (HCC): ICD-10-CM

## 2024-11-27 DIAGNOSIS — B34.9 ACUTE VIRAL SYNDROME: ICD-10-CM

## 2024-11-27 LAB
ALBUMIN SERPL BCP-MCNC: 4 G/DL (ref 3.2–4.9)
ALBUMIN/GLOB SERPL: 1.2 G/DL
ALP SERPL-CCNC: 107 U/L (ref 30–99)
ALT SERPL-CCNC: 68 U/L (ref 2–50)
ANION GAP SERPL CALC-SCNC: 21 MMOL/L (ref 7–16)
APPEARANCE UR: CLEAR
AST SERPL-CCNC: 45 U/L (ref 12–45)
BACTERIA BLD CULT: NORMAL
BACTERIA BLD CULT: NORMAL
BASOPHILS # BLD AUTO: 0.2 % (ref 0–1.8)
BASOPHILS # BLD: 0.02 K/UL (ref 0–0.12)
BILIRUB SERPL-MCNC: 0.6 MG/DL (ref 0.1–1.5)
BILIRUB UR QL STRIP.AUTO: NEGATIVE
BUN SERPL-MCNC: 16 MG/DL (ref 8–22)
CALCIUM ALBUM COR SERPL-MCNC: 8.7 MG/DL (ref 8.5–10.5)
CALCIUM SERPL-MCNC: 8.7 MG/DL (ref 8.5–10.5)
CHLORIDE SERPL-SCNC: 97 MMOL/L (ref 96–112)
CO2 SERPL-SCNC: 15 MMOL/L (ref 20–33)
COLOR UR: YELLOW
CREAT SERPL-MCNC: 0.86 MG/DL (ref 0.5–1.4)
EKG IMPRESSION: NORMAL
EOSINOPHIL # BLD AUTO: 0 K/UL (ref 0–0.51)
EOSINOPHIL NFR BLD: 0 % (ref 0–6.9)
ERYTHROCYTE [DISTWIDTH] IN BLOOD BY AUTOMATED COUNT: 38.3 FL (ref 35.9–50)
FLUAV RNA SPEC QL NAA+PROBE: NEGATIVE
FLUBV RNA SPEC QL NAA+PROBE: NEGATIVE
GFR SERPLBLD CREATININE-BSD FMLA CKD-EPI: 82 ML/MIN/1.73 M 2
GLOBULIN SER CALC-MCNC: 3.3 G/DL (ref 1.9–3.5)
GLUCOSE SERPL-MCNC: 203 MG/DL (ref 65–99)
GLUCOSE UR STRIP.AUTO-MCNC: NEGATIVE MG/DL
HCT VFR BLD AUTO: 36.2 % (ref 37–47)
HGB BLD-MCNC: 12.6 G/DL (ref 12–16)
IMM GRANULOCYTES # BLD AUTO: 0.06 K/UL (ref 0–0.11)
IMM GRANULOCYTES NFR BLD AUTO: 0.7 % (ref 0–0.9)
KETONES UR STRIP.AUTO-MCNC: 15 MG/DL
LACTATE SERPL-SCNC: 3.5 MMOL/L (ref 0.5–2)
LACTATE SERPL-SCNC: 5.3 MMOL/L (ref 0.5–2)
LEUKOCYTE ESTERASE UR QL STRIP.AUTO: NEGATIVE
LYMPHOCYTES # BLD AUTO: 0.92 K/UL (ref 1–4.8)
LYMPHOCYTES NFR BLD: 10.2 % (ref 22–41)
MCH RBC QN AUTO: 29.6 PG (ref 27–33)
MCHC RBC AUTO-ENTMCNC: 34.8 G/DL (ref 32.2–35.5)
MCV RBC AUTO: 85 FL (ref 81.4–97.8)
MICRO URNS: ABNORMAL
MONOCYTES # BLD AUTO: 0.55 K/UL (ref 0–0.85)
MONOCYTES NFR BLD AUTO: 6.1 % (ref 0–13.4)
NEUTROPHILS # BLD AUTO: 7.48 K/UL (ref 1.82–7.42)
NEUTROPHILS NFR BLD: 82.8 % (ref 44–72)
NITRITE UR QL STRIP.AUTO: NEGATIVE
NRBC # BLD AUTO: 0 K/UL
NRBC BLD-RTO: 0 /100 WBC (ref 0–0.2)
PH UR STRIP.AUTO: 6.5 [PH] (ref 5–8)
PLATELET # BLD AUTO: 172 K/UL (ref 164–446)
PMV BLD AUTO: 9.2 FL (ref 9–12.9)
POTASSIUM SERPL-SCNC: 3 MMOL/L (ref 3.6–5.5)
PROT SERPL-MCNC: 7.3 G/DL (ref 6–8.2)
PROT UR QL STRIP: NEGATIVE MG/DL
RBC # BLD AUTO: 4.26 M/UL (ref 4.2–5.4)
RBC UR QL AUTO: NEGATIVE
RSV RNA SPEC QL NAA+PROBE: NEGATIVE
SARS-COV-2 RNA RESP QL NAA+PROBE: NOTDETECTED
SIGNIFICANT IND 70042: NORMAL
SIGNIFICANT IND 70042: NORMAL
SITE SITE: NORMAL
SITE SITE: NORMAL
SODIUM SERPL-SCNC: 133 MMOL/L (ref 135–145)
SOURCE SOURCE: NORMAL
SOURCE SOURCE: NORMAL
SP GR UR STRIP.AUTO: >1.045
UROBILINOGEN UR STRIP.AUTO-MCNC: 1 EU/DL
WBC # BLD AUTO: 9 K/UL (ref 4.8–10.8)

## 2024-11-27 PROCEDURE — 74177 CT ABD & PELVIS W/CONTRAST: CPT

## 2024-11-27 PROCEDURE — 81003 URINALYSIS AUTO W/O SCOPE: CPT

## 2024-11-27 PROCEDURE — 700105 HCHG RX REV CODE 258: Performed by: EMERGENCY MEDICINE

## 2024-11-27 PROCEDURE — 96365 THER/PROPH/DIAG IV INF INIT: CPT

## 2024-11-27 PROCEDURE — 80053 COMPREHEN METABOLIC PANEL: CPT

## 2024-11-27 PROCEDURE — 0241U HCHG SARS-COV-2 COVID-19 NFCT DS RESP RNA 4 TRGT ED POC: CPT

## 2024-11-27 PROCEDURE — 700111 HCHG RX REV CODE 636 W/ 250 OVERRIDE (IP): Mod: JZ | Performed by: EMERGENCY MEDICINE

## 2024-11-27 PROCEDURE — 83605 ASSAY OF LACTIC ACID: CPT

## 2024-11-27 PROCEDURE — 700102 HCHG RX REV CODE 250 W/ 637 OVERRIDE(OP): Mod: JZ | Performed by: EMERGENCY MEDICINE

## 2024-11-27 PROCEDURE — 96375 TX/PRO/DX INJ NEW DRUG ADDON: CPT

## 2024-11-27 PROCEDURE — A9270 NON-COVERED ITEM OR SERVICE: HCPCS | Mod: JZ | Performed by: EMERGENCY MEDICINE

## 2024-11-27 PROCEDURE — 87040 BLOOD CULTURE FOR BACTERIA: CPT | Mod: 91

## 2024-11-27 PROCEDURE — 700117 HCHG RX CONTRAST REV CODE 255: Performed by: EMERGENCY MEDICINE

## 2024-11-27 PROCEDURE — 93005 ELECTROCARDIOGRAM TRACING: CPT | Performed by: EMERGENCY MEDICINE

## 2024-11-27 PROCEDURE — 99285 EMERGENCY DEPT VISIT HI MDM: CPT

## 2024-11-27 PROCEDURE — 85025 COMPLETE CBC W/AUTO DIFF WBC: CPT

## 2024-11-27 PROCEDURE — RXMED WILLOW AMBULATORY MEDICATION CHARGE: Performed by: EMERGENCY MEDICINE

## 2024-11-27 PROCEDURE — 87086 URINE CULTURE/COLONY COUNT: CPT

## 2024-11-27 PROCEDURE — 36415 COLL VENOUS BLD VENIPUNCTURE: CPT

## 2024-11-27 PROCEDURE — 71045 X-RAY EXAM CHEST 1 VIEW: CPT

## 2024-11-27 PROCEDURE — 96376 TX/PRO/DX INJ SAME DRUG ADON: CPT

## 2024-11-27 RX ORDER — LORAZEPAM 2 MG/ML
1 INJECTION INTRAMUSCULAR ONCE
Status: COMPLETED | OUTPATIENT
Start: 2024-11-27 | End: 2024-11-27

## 2024-11-27 RX ORDER — SODIUM CHLORIDE, SODIUM LACTATE, POTASSIUM CHLORIDE, AND CALCIUM CHLORIDE .6; .31; .03; .02 G/100ML; G/100ML; G/100ML; G/100ML
30 INJECTION, SOLUTION INTRAVENOUS ONCE
Status: COMPLETED | OUTPATIENT
Start: 2024-11-27 | End: 2024-11-27

## 2024-11-27 RX ORDER — LOPERAMIDE HYDROCHLORIDE 2 MG/1
TABLET ORAL
Qty: 20 TABLET | Refills: 0 | Status: SHIPPED | OUTPATIENT
Start: 2024-11-27

## 2024-11-27 RX ORDER — LORAZEPAM 2 MG/ML
2 INJECTION INTRAMUSCULAR ONCE
Status: COMPLETED | OUTPATIENT
Start: 2024-11-27 | End: 2024-11-27

## 2024-11-27 RX ORDER — POTASSIUM CHLORIDE 1500 MG/1
40 TABLET, EXTENDED RELEASE ORAL ONCE
Status: COMPLETED | OUTPATIENT
Start: 2024-11-27 | End: 2024-11-27

## 2024-11-27 RX ORDER — KETOROLAC TROMETHAMINE 15 MG/ML
15 INJECTION, SOLUTION INTRAMUSCULAR; INTRAVENOUS ONCE
Status: COMPLETED | OUTPATIENT
Start: 2024-11-27 | End: 2024-11-27

## 2024-11-27 RX ORDER — POTASSIUM CHLORIDE 750 MG/1
10 TABLET, EXTENDED RELEASE ORAL 2 TIMES DAILY
Qty: 20 TABLET | Refills: 0 | Status: SHIPPED | OUTPATIENT
Start: 2024-11-27

## 2024-11-27 RX ORDER — DIAZEPAM 5 MG/1
5-10 TABLET ORAL EVERY 6 HOURS PRN
Qty: 20 TABLET | Refills: 0 | Status: SHIPPED | OUTPATIENT
Start: 2024-11-27 | End: 2024-11-27

## 2024-11-27 RX ORDER — ONDANSETRON 4 MG/1
4 TABLET, ORALLY DISINTEGRATING ORAL EVERY 8 HOURS PRN
Qty: 8 TABLET | Refills: 0 | Status: SHIPPED | OUTPATIENT
Start: 2024-11-27

## 2024-11-27 RX ORDER — ONDANSETRON 2 MG/ML
4 INJECTION INTRAMUSCULAR; INTRAVENOUS ONCE
Status: COMPLETED | OUTPATIENT
Start: 2024-11-27 | End: 2024-11-27

## 2024-11-27 RX ORDER — DIAZEPAM 5 MG/1
5-10 TABLET ORAL EVERY 6 HOURS PRN
Qty: 20 TABLET | Refills: 0 | Status: SHIPPED | OUTPATIENT
Start: 2024-11-27 | End: 2024-12-02

## 2024-11-27 RX ORDER — PHENOBARBITAL SODIUM 130 MG/ML
130 INJECTION, SOLUTION INTRAMUSCULAR; INTRAVENOUS ONCE
Status: DISCONTINUED | OUTPATIENT
Start: 2024-11-27 | End: 2024-11-27

## 2024-11-27 RX ADMIN — POTASSIUM CHLORIDE 40 MEQ: 1500 TABLET, EXTENDED RELEASE ORAL at 12:55

## 2024-11-27 RX ADMIN — ONDANSETRON 4 MG: 2 INJECTION INTRAMUSCULAR; INTRAVENOUS at 11:23

## 2024-11-27 RX ADMIN — IOHEXOL 100 ML: 350 INJECTION, SOLUTION INTRAVENOUS at 11:55

## 2024-11-27 RX ADMIN — LORAZEPAM 1 MG: 2 INJECTION INTRAMUSCULAR; INTRAVENOUS at 14:36

## 2024-11-27 RX ADMIN — CEFAZOLIN 2 G: 2 INJECTION, POWDER, FOR SOLUTION INTRAMUSCULAR; INTRAVENOUS at 14:05

## 2024-11-27 RX ADMIN — LORAZEPAM 2 MG: 2 INJECTION INTRAMUSCULAR; INTRAVENOUS at 13:57

## 2024-11-27 RX ADMIN — KETOROLAC TROMETHAMINE 15 MG: 15 INJECTION, SOLUTION INTRAMUSCULAR; INTRAVENOUS at 11:23

## 2024-11-27 RX ADMIN — SODIUM CHLORIDE, POTASSIUM CHLORIDE, SODIUM LACTATE AND CALCIUM CHLORIDE 1710 ML: 600; 310; 30; 20 INJECTION, SOLUTION INTRAVENOUS at 11:35

## 2024-11-27 ASSESSMENT — FIBROSIS 4 INDEX: FIB4 SCORE: 1.388888888888888889

## 2024-11-27 ASSESSMENT — PAIN DESCRIPTION - PAIN TYPE: TYPE: ACUTE PAIN

## 2024-11-27 NOTE — DISCHARGE PLANNING
@15:47 - Provided peer support services to PT receiving mental health and/or substance use treatment, fostering a supportive and non-judgmental environment.  Encouraged PT to actively participate in his treatment planning and decision-making processes, fostering a sense of autonomy and self-efficacy.    Educated PT in accessing community resources, , and other support networks to enhance their overall well-being and quality of life.  PT and boyfriend were both receptive to resources and support.  PT also has access to transportation. PT was provided resources that are appropriate to her insurance and [employer] plan.    1) [facility]  2) [facility]  3) [facility] (PHP and IOP options)  4) AA meetings within her area code and the larger [place] area.    Bedside RN advised of consult and PT being prepared for imminent DC.

## 2024-11-27 NOTE — ED PROVIDER NOTES
ED Provider Note    Scribed for Derian Gannon M.D. by Derian Gannon M.D.. 11/27/2024  10:26 AM    Primary care provider: Dwayne Lay M.D.  CHIEF COMPLAINT  Chief Complaint   Patient presents with    Flu Like Symptoms     X 2 days N/V/D with fevers.     Shortness of Breath       EXTERNAL RECORDS REVIEWED  Clinic visit from August 2023 reviewed for rash.  She was diagnosed with allergic reaction and treated with Medrol, with pruritus and treated with hydroxyzine, and diagnosed with atopic dermatitis treated with triamcinolone.    HPI    Ann Munoz is a 48 y.o. female with a history of asthma who users inhalers and breathing treatment who presents to the Emergency Department for evaluation of abdominal pain onset two days. She reports associated fever, headache, cough, diarrhea and shortness of breath. She specifies having two episodes of both emesis and diarrhea for the past two days.  She reports surgical history of cholecystectomy. No other medical history noted. She does shortness of breath.  Her last drink was 4 to 5 days ago.  She has been tremulous all week.    LIMITATION TO HISTORY   None    OUTSIDE HISTORIAN(S):   provided history the patient is a daily drinker.     PAST MEDICAL HISTORY  Past Medical History:   Diagnosis Date    Hypertension     Psoriasis        SOCIAL HISTORY  Social History     Tobacco Use    Smoking status: Never    Smokeless tobacco: Never    Tobacco comments:     denies   Vaping Use    Vaping status: Never Used   Substance Use Topics    Alcohol use: Yes     Alcohol/week: 12.0 oz     Types: 20 Shots of liquor per week    Drug use: Yes     Frequency: 7.0 times per week     Types: Marijuana, Inhaled     Comment: Marijuana     Social History     Substance and Sexual Activity   Drug Use Yes    Frequency: 7.0 times per week    Types: Marijuana, Inhaled    Comment: Marijuana       SURGICAL HISTORY  History reviewed. No pertinent surgical history.    CURRENT MEDICATIONSCurrent  "Outpatient Medications:     EPINEPHrine 0.3 MG/0.3ML Solution Prefilled Syringe, Inject the contents of the epipen into the thigh, hold for 3 seconds and release from thigh., Disp: 2 Each, Rfl: 0    albuterol 108 (90 Base) MCG/ACT Aero Soln inhalation aerosol, Inhale 2 Puffs every 6 hours as needed for Shortness of Breath., Disp: 8.5 g, Rfl: 0    Guselkumab (TREMFYA) 100 MG/ML Solution Pen-injector, Inject 100 mg under the skin every 8 weeks. Last injection was on 5/29/2024, Disp: , Rfl:     Ibuprofen (MOTRIN PO), Take 3 Tablets by mouth 2 times a day as needed (For pain)., Disp: , Rfl:     cetirizine (ZYRTEC) 10 MG Tab, Take 10 mg by mouth as needed for Allergies (Swelling in face and hives on arms)., Disp: , Rfl:     ALLERGIES  No Known Allergies    PHYSICAL EXAM  VITAL SIGNS: /68   Pulse 92   Temp (!) 38.9 °C (102 °F) (Oral)   Resp (!) 38   Ht 1.651 m (5' 5\")   Wt 101 kg (222 lb)   SpO2 97%   BMI 36.94 kg/m²   Reviewed and febrile  Constitutional: Tremulous, Elevated BMI, Appears uncomfortable,   HENT: Normocephalic, atraumatic, bilateral external ears normal, No intraoral erythema, edema, exudate  Eyes: PERRLA, conjunctiva pink, no scleral icterus.   Cardiovascular: Regular rate and rhythm. No murmurs, rubs or gallops.  No dependent edema or calf tenderness  Respiratory: Lungs clear to auscultation bilaterally. No wheezes, rales, or rhonchi.  Abdominal:  Mild diffuse abdominal tenderness, Abdomen soft, non distended. No rebound, or guarding.    Skin: No erythema, no rash. No wounds or bruising.  Genitourinary: No costovertebral angle tenderness.   Musculoskeletal: no deformities.   Neurologic: Alert & oriented x 3, cranial nerves 2-12 intact by passive exam.  No focal deficit noted.  Psychiatric: Affect normal, Judgment normal, Mood normal.     LABS Ordered and Reviewed by Me:  Results for orders placed or performed during the hospital encounter of 11/27/24   EKG    Collection Time: 11/27/24 10:32 " AM   Result Value Ref Range    Report       [facility] Emergency Dept.    Test Date:  2024  Pt Name:    CHIARA FROD              Department: ER  MRN:        [id number]                      Room:        25  Gender:     Female                       Technician: [id number]  :        1975                   Requested By:ER TRIAGE PROTOCOL  Order #:    [id number]                    Reading MD:    Measurements  Intervals                                Axis  Rate:       83                           P:          0  AR:         0                            QRS:        75  QRSD:       105                          T:          42  QT:         420  QTc:        494    Interpretive Statements  Atrial fibrillation  Minimal ST depression, inferior leads  Borderline prolonged QT interval  No previous ECG available for comparison     Lactic Acid    Collection Time: 24 10:39 AM   Result Value Ref Range    Lactic Acid 5.3 (HH) 0.5 - 2.0 mmol/L   CBC with Differential    Collection Time: 24 10:39 AM   Result Value Ref Range    WBC 9.0 4.8 - 10.8 K/uL    RBC 4.26 4.20 - 5.40 M/uL    Hemoglobin 12.6 12.0 - 16.0 g/dL    Hematocrit 36.2 (L) 37.0 - 47.0 %    MCV 85.0 81.4 - 97.8 fL    MCH 29.6 27.0 - 33.0 pg    MCHC 34.8 32.2 - 35.5 g/dL    RDW 38.3 35.9 - 50.0 fL    Platelet Count 172 164 - 446 K/uL    MPV 9.2 9.0 - 12.9 fL    Neutrophils-Polys 82.80 (H) 44.00 - 72.00 %    Lymphocytes 10.20 (L) 22.00 - 41.00 %    Monocytes 6.10 0.00 - 13.40 %    Eosinophils 0.00 0.00 - 6.90 %    Basophils 0.20 0.00 - 1.80 %    Immature Granulocytes 0.70 0.00 - 0.90 %    Nucleated RBC 0.00 0.00 - 0.20 /100 WBC    Neutrophils (Absolute) 7.48 (H) 1.82 - 7.42 K/uL    Lymphs (Absolute) 0.92 (L) 1.00 - 4.80 K/uL    Monos (Absolute) 0.55 0.00 - 0.85 K/uL    Eos (Absolute) 0.00 0.00 - 0.51 K/uL    Baso (Absolute) 0.02 0.00 - 0.12 K/uL    Immature Granulocytes (abs) 0.06 0.00 - 0.11 K/uL    NRBC (Absolute) 0.00 K/uL   Complete  Metabolic Panel    Collection Time: 11/27/24 10:39 AM   Result Value Ref Range    Sodium 133 (L) 135 - 145 mmol/L    Potassium 3.0 (L) 3.6 - 5.5 mmol/L    Chloride 97 96 - 112 mmol/L    Co2 15 (L) 20 - 33 mmol/L    Anion Gap 21.0 (H) 7.0 - 16.0    Glucose 203 (H) 65 - 99 mg/dL    Bun 16 8 - 22 mg/dL    Creatinine 0.86 0.50 - 1.40 mg/dL    Calcium 8.7 8.5 - 10.5 mg/dL    Correct Calcium 8.7 8.5 - 10.5 mg/dL    AST(SGOT) 45 12 - 45 U/L    ALT(SGPT) 68 (H) 2 - 50 U/L    Alkaline Phosphatase 107 (H) 30 - 99 U/L    Total Bilirubin 0.6 0.1 - 1.5 mg/dL    Albumin 4.0 3.2 - 4.9 g/dL    Total Protein 7.3 6.0 - 8.2 g/dL    Globulin 3.3 1.9 - 3.5 g/dL    A-G Ratio 1.2 g/dL   ESTIMATED GFR    Collection Time: 11/27/24 10:39 AM   Result Value Ref Range    GFR (CKD-EPI) 82 >60 mL/min/1.73 m 2   POC CoV-2, FLU A/B, RSV by PCR    Collection Time: 11/27/24 10:45 AM   Result Value Ref Range    POC Influenza A RNA, PCR Negative Negative    POC Influenza B RNA, PCR Negative Negative    POC RSV, by PCR Negative Negative    POC SARS-CoV-2, PCR NotDetected NotDetected       Interpretations:    Pulse Oximetry: Normal on room air 97%      EKG Interpretation by me    Indication: Shortness of breath.     Rhythm: normal sinus   Rate: Normal at 83  Axis: normal  Ectopy: none  Conduction: normal  ST/T Waves: no acute change  Q Waves: none  R Wave progression: normal  Hypertrophy changes: Absent      Clinical Impression: Normal sinus rhythm    RADIOLOGY  I have independently interpreted the DX-Chest associated with this visit demonstrating no pneumonia.  I am awaiting the final reading from the radiologist.     Final Radiology Report  CT-ABDOMEN-PELVIS WITH   Final Result      1.  No acute inflammation in the abdomen or pelvis.   2.  Hepatic steatosis and hepatomegaly.   3.  Nondilated appendix with appendicolith.   4.  Colonic diverticulosis.   5.  2.0 cm right adnexal cyst.      DX-CHEST-PORTABLE (1 VIEW)   Final Result         1. No acute  cardiopulmonary abnormalities are identified.        Radiologist interpretation have been reviewed by me.     COURSE & MEDICAL DECISION MAKING  10:26 AM - Patient seen and examined at bedside. I explained plan of care, including labs and imaging. She agrees to plan of care.     INTERVENTIONS BY ME:  Medications   LORazepam (Ativan) injection 1 mg (has no administration in time range)   ondansetron (Zofran) syringe/vial injection 4 mg (4 mg Intravenous Given 11/27/24 1123)   ketorolac (Toradol) 15 MG/ML injection 15 mg (15 mg Intravenous Given 11/27/24 1123)   LR (Bolus) infusion 1,710 mL (0 mL Intravenous Stopped 11/27/24 1400)   potassium chloride SA (Kdur) tablet 40 mEq (40 mEq Oral Given 11/27/24 1255)   iohexol (OMNIPAQUE) 350 mg/mL (IV) (100 mL Intravenous Given 11/27/24 1155)   ceFAZolin (Ancef) 2 g in  mL IVPB (2 g Intravenous New Bag 11/27/24 1405)   LORazepam (Ativan) injection 2 mg (2 mg Intravenous Given 11/27/24 1357)       2:37 PM - On reassessment response to intervention patient better in terms of tremor and alertness after lorazepam    ASSESSMENT, COURSE AND PLAN:  PROBLEMS EVALUATED THIS VISIT:    This patient presents with fever diarrhea cough and vomiting and looked as if she had influenza.  There was no evidence of pneumonia on chest x-ray and viral studies ended up being negative for influenza RSV and COVID.  Once I learned this I was concerned she may have an intra-abdominal source of sepsis so CT abdomen and pelvis was ordered and she had an appendicolith but no evidence of acute appendicitis.  Most concerning was her initial lactic acidosis but at this point no source has been found.  Urinalysis was negative for UTI.  The patient also has alcohol withdrawal and is tremulous and not having chills is much as originally thought.  She should be in the latter stages of withdrawal and there is no evidence of tachycardia hallucinations or seizure.  She responded well to lorazepam and will be  treated with outpatient diazepam.  The patient was dehydrated with a lactic acidosis and treated with IV fluids.  The patient had hypokalemia and will be treated orally as an outpatient.    Measures:     Sepsis:  Infection identified at 11:32 AM.  Sepsis bundle, antibiotics, and IVF initiated .     HYDRATION: Intravenous fluids were medically necessary given elevated lab take.      DISPOSITION AND DISCUSSIONS    RISK:  High given need for IV benzodiazepine    PLAN:  New Prescriptions    DIAZEPAM (VALIUM) 5 MG TAB    Take 1-2 Tablets by mouth every 6 hours as needed (withdrawal) for up to 5 days.    LOPERAMIDE (IMODIUM A-D) 2 MG TABLET    Take 2 tablets for diarrhea and one tablet after each loose stool to max 8 tablets daily    ONDANSETRON (ZOFRAN ODT) 4 MG TABLET DISPERSIBLE    Take 1 Tablet by mouth every 8 hours as needed for Nausea/Vomiting.    POTASSIUM CHLORIDE ER (KLOR-CON) 10 MEQ TABLET    Take 1 Tablet by mouth 2 times a day.       Ibuprofen acetaminophen rest and fluids    Alcohol withdrawal and viral syndrome handout given    Return for severe withdrawal shortness of breath abdominal pain persistent dizziness, ill appearance    Followup:  Dwayne Lay M.D.  [id number]  [address]  [facility] [address]  [phone]    Schedule an appointment as soon as possible for a visit   As needed if not better Monday in terms of fever      CONDITION: Stable, improved.     FINAL IMPRESSION  1. Acute viral syndrome    2. Alcohol withdrawal syndrome without complication (HCC)    3. Hypokalemia          Rio SANDOVAL (Isabel), am scribing for, and in the presence of, Derian Gannon M.D..    Electronically signed by: Rio Hilliard (Scribe), 11/27/2024    Derian SANDOVAL M.D. personally performed the services described in this documentation, as scribed by Rio Hilliard in my presence, and it is both accurate and complete.     The note accurately reflects work and decisions made by me.  Derian Gannon M.D.  11/27/2024  2:40  PM

## 2024-11-27 NOTE — FACE TO FACE
Face to Face Supporting Documentation - Home Health    The encounter with this patient was in whole or in part the primary reason for home health admission.    Date of encounter:   Patient:                    MRN:                       YOB: 2026  Ann Munoz  [id number]  1975     Home health to see patient for:  Skilled Nursing care for assessment, interventions & education    Skilled need for:  Exacerbation of Chronic Disease State Severe emphysema with partial compliance oxygen and noncompliance Broana    Skilled nursing interventions to include:  Comment: Ensure taking medication and using oxygen.  Also needs home health aid for showering/ADLs    Homebound status evidenced by:  Have a condition such that leaving his or her home is medically contraindicated. Leaving home requires a considerable and taxing effort due to severe emphysema and oxygen needs and difficulty manipulating oxygen. There is a normal inability to leave the home.    Community Physician to provide follow up care: Dwayne Lay M.D.     Optional Interventions? No      I certify the face to face encounter for this home health care referral meets the CMS requirements and the encounter/clinical assessment with the patient was, in whole, or in part, for the medical condition(s) listed above, which is the primary reason for home health care. Based on my clinical findings: the service(s) are medically necessary, support the need for home health care, and the homebound criteria are met.  I certify that this patient has had a face to face encounter by myself.  Derian Gannon M.D. - NPI: [id number]

## 2024-11-27 NOTE — ED TRIAGE NOTES
Chief Complaint   Patient presents with    Flu Like Symptoms     X 2 days N/V/D with fevers.     Shortness of Breath     Patient given 400 mg LR at 10 am, 1 g Tylenol IV and 4 mg Zofran PTA by EMS.

## 2024-11-27 NOTE — DISCHARGE PLANNING
@15:00 - Collaborated with behavioral health and ED staff, social workers, and other service providers to develop comprehensive care plans that address the unique needs of the PT.  PT presents at ED for C/C of Flu Like Symptoms and Shortness of Breath at Triage.  PT interviewed by Alert Team and PT requested resources for ETOH detox and treatment.  PT told Alert Team that she has recently come off  ETOH use and has been sober for a couple days and is looking for sobriety support.  Her last drink was 4 to 5 days ago. PT reports she has been tremulous all week.     Needs Assessment: PT is housed and sheltered at [address] [place] [address], [facility] [address].  PT has medicaid/[facility].  PT's boyfriend is at bedside.

## 2024-11-27 NOTE — DISCHARGE INSTRUCTIONS
" Viral Illness    Take ibuprofen 600-800 mg every 6 hours or naproxen 440-500 mg every 12 hours for pain and fever.  Rest, drink plenty of fluids and take Tylenol if needed for pain.  Take Zofran for nausea and Imodium for diarrhea.  Take lorazepam for alcohol withdrawal.  Return for ill appearance or shortness of breath.  See a doctor if not better or worsening after 5-6 days of fever or other significant symptoms.       Your exam indicates you have a viral illness.  Typical symptoms of virus infections include: fever, muscle aches, headache, fatigue, stomach upsets, sore throat, and dry cough. Antibiotic drugs are not effective in viral illnesses; they are only given when there is a secondary bacterial infection.    General treatment includes bed rest, increasing oral fluid intake of clear, non-caffeinated drinks like ginger ale, fruit juices, water, or sports (electrolyte) drinks, and medicine to relieve specific symptoms such as cough, pain, or diarrhea.  Acetaminophen (Tylenol) or ibuprofen may be used to help control fever and pain.      Please call your doctor if you are not better after 2-3 days of symptom treatment.  Call or return here right away if your illness gets more severe, or you develop any other new symptoms, such as a fever above 103 F, vomiting for more than a day, severe headache or other pain, stiff neck, trouble breathing, visual problems, \"blackouts\" or fainting.    Document Released: 12/18/2006    [employer]® Patient Information ©2008 [employer].    "

## 2024-11-27 NOTE — ED NOTES
Alert team saw patient at bedside, resources given for detox and alcohol use. Reviewed discharge instructions with patient. Verbalized understanding. Patient leaving ER in stable condition.

## 2024-11-29 LAB
BACTERIA UR CULT: NORMAL
SIGNIFICANT IND 70042: NORMAL
SITE SITE: NORMAL
SOURCE SOURCE: NORMAL

## 2024-12-02 LAB
BACTERIA BLD CULT: NORMAL
BACTERIA BLD CULT: NORMAL
SIGNIFICANT IND 70042: NORMAL
SIGNIFICANT IND 70042: NORMAL
SITE SITE: NORMAL
SITE SITE: NORMAL
SOURCE SOURCE: NORMAL
SOURCE SOURCE: NORMAL